# Patient Record
Sex: MALE | Race: WHITE | NOT HISPANIC OR LATINO | Employment: OTHER | URBAN - METROPOLITAN AREA
[De-identification: names, ages, dates, MRNs, and addresses within clinical notes are randomized per-mention and may not be internally consistent; named-entity substitution may affect disease eponyms.]

---

## 2020-08-20 ENCOUNTER — HOSPITAL ENCOUNTER (OUTPATIENT)
Dept: MRI IMAGING | Facility: HOSPITAL | Age: 35
Discharge: HOME | End: 2020-08-20
Attending: CHIROPRACTOR

## 2020-09-09 ENCOUNTER — OFFICE VISIT CONVERTED (OUTPATIENT)
Dept: NEUROSURGERY | Facility: CLINIC | Age: 35
End: 2020-09-09
Attending: PHYSICIAN ASSISTANT

## 2021-01-05 ENCOUNTER — OFFICE VISIT CONVERTED (OUTPATIENT)
Dept: NEUROLOGY | Facility: CLINIC | Age: 36
End: 2021-01-05
Attending: PSYCHIATRY & NEUROLOGY

## 2021-02-24 ENCOUNTER — CONVERSION ENCOUNTER (OUTPATIENT)
Dept: NEUROLOGY | Facility: CLINIC | Age: 36
End: 2021-02-24

## 2021-02-24 ENCOUNTER — OFFICE VISIT CONVERTED (OUTPATIENT)
Dept: NEUROSURGERY | Facility: CLINIC | Age: 36
End: 2021-02-24
Attending: PHYSICIAN ASSISTANT

## 2021-04-20 ENCOUNTER — OFFICE VISIT CONVERTED (OUTPATIENT)
Dept: NEUROSURGERY | Facility: CLINIC | Age: 36
End: 2021-04-20
Attending: NEUROLOGICAL SURGERY

## 2021-04-20 ENCOUNTER — CONVERSION ENCOUNTER (OUTPATIENT)
Dept: NEUROLOGY | Facility: CLINIC | Age: 36
End: 2021-04-20

## 2021-04-26 ENCOUNTER — HOSPITAL ENCOUNTER (OUTPATIENT)
Dept: PREADMISSION TESTING | Facility: HOSPITAL | Age: 36
Discharge: HOME | End: 2021-04-26
Attending: NEUROLOGICAL SURGERY

## 2021-04-27 LAB — SARS-COV-2 RNA SPEC QL NAA+PROBE: NOT DETECTED

## 2021-04-30 ENCOUNTER — HOSPITAL ENCOUNTER (OUTPATIENT)
Dept: PERIOP | Facility: HOSPITAL | Age: 36
Setting detail: HOSPITAL OUTPATIENT SURGERY
Discharge: HOME | End: 2021-04-30
Attending: NEUROLOGICAL SURGERY

## 2021-05-10 NOTE — H&P
History and Physical      Patient Name: Long Casillas   Patient ID: [id number]   Sex: Male   YOB: 1985    Referring Provider: Jono Calixto MD    Visit Date: January 5, 2021    Provider: Jimmy Turner MD   Location: [facility]   Location Address: [address] [place] [address]  [id number]   Location Phone: [phone]          Chief Complaint     New patient here for EMG of BUE       History Of Present Illness  Long Casillas is a 35 year old male who presents today to [facility] today referred from Jono Calixto MD.      35-year-old very pleasant young man evaluated for bilateral hand numbness and tingling for the last 5 years.  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  It involves the whole hand.  He states that his been wearing a wrist splint at night on both hands which helps his symptoms.  He has been wearing the splints for a long time which he brought himself.  He was never told in the past he had carpal tunnel syndrome.  He has had an MRI of the cervical spine which I reviewed which is unremarkable.  He is here today for nerve conduction study.       Past Medical History  Cervicalgia         Past Surgical History  *Denies any surgical procedures         Medication List  naproxen sodium 220 mg oral capsule         Allergy List  NO KNOWN DRUG ALLERGIES       Allergies Reconciled  Family Medical History  Family history of diabetes mellitus         Social History  Marijuana Use; Tobacco (Current every day)         Review of Systems  · Constitutional  o Denies  o : chills, excessive sweating, fatigue, fever, sycope/passing out, weight gain, weight loss  · Eyes  o Admits  o : changes in vision  o Denies  o : blurred vision, double vision  · HENT  o Denies  o : hearing loss, ringing in the ears,  ear aches, sore throat, nasal congestion, sinus pain, nose bleeds, seasonal allergies  · Cardiovascular  o Denies  o : blood clots, swollen legs, anemia, easy burising or bleeding, transfusions  · Respiratory  o Denies  o : shortness of breath, dry cough, productive cough, pneumonia, COPD  · Gastrointestinal  o Denies  o : dysphagia, reflux  · Genitourinary  o Denies  o : incontinence  · Neurologic  o Admits  o : headache, difficulty with sleep, numbness/tingling/paresthesia , weakness  o Denies  o : seizure, stroke, tremor, loss of balance, falls, dizziness/vertigo, difficulty with coordination, difficulty with dexterity  · Musculoskeletal  o Admits  o : neck stiffness/pain, joint pain, weakness, spasms, sciatica, pain radiating in arm, pain radiating in leg, low back pain  o Denies  o : swollen lymph nodes, muscle aches  · Endocrine  o Denies  o : diabetes, thyroid disorder  · Psychiatric  o Denies  o : anxiety, depression      Vitals  Date Time BP Position Site L\R Cuff Size HR RR TEMP (F) WT  HT  BMI kg/m2 BSA m2 O2 Sat FR L/min FiO2 HC       01/05/2021 08:41 /92 Sitting    91 - R  97.8 254lbs 4oz 6'   34.48 2.42             Physical Examination     He is alert, fluent, phasic, follows commands well.  There is no weakness of the upper extremities on individual muscle testing.  There is no weakness of intrinsic and muscles.  Phalen sign is negative.  Pressure to the wrist produces tingling and pain going up his arms which reproduces his symptoms.           Assessment  · Carpal tunnel syndrome     354.0/G56.00  He is to follow-up with neurosurgery for carpal tunnel surgery. I discussed with him regarding carpal tunnel syndrome and is to continue wearing his wrist splints until he has the carpal tunnel surgery.    I discussed with him that his symptoms are not secondary to cervical radiculopathy and therefore an EMG study was not performed.    Total time spent with patient according patient care was 15  minutes  · Numbness and tingling       Anesthesia of skin     782.0/R20.0  Paresthesia of skin     782.0/R20.2  The study is abnormal and shows electrophysiologic evidence for moderate bilateral carpal tunnel syndrome right greater than left.      Plan  · Orders  o Nerve conduction studies; 7-8 studies (98421) - 782.0/R20.0, 782.0/R20.2, 354.0/G56.00 - 01/05/2021  · Medications  o Medications have been Reconciled  o Transition of Care or Provider Policy  · Instructions  o Encouraged to follow-up with Primary Care Provider for preventative care.            Electronically Signed by: Jimmy Turner MD -Author on January 12, 2021 05:27:54 PM

## 2021-05-10 NOTE — H&P
History and Physical      Patient Name: Long Casillas   Patient ID: [id number]   Sex: Male   YOB: 1985    Referring Provider: Jono Calixto MD    Visit Date: September 9, 2020    Provider: Mary Jo Ryder PA-C   Location: [facility]   Location Address: [address] [place] [address]  [id number]   Location Phone: [phone]          Chief Complaint  · Neck pain and hand numbness      History Of Present Illness  The patient is a 34 year old male, who presents on referral from Jono Calixto MD, for a neurosurgical evaluation for a history of neck pain, left arm pain, and paresthesias.   The left arm pain is currently moderate and localized to the distal upper extremity distribution. The neck pain is localized to the posterior cervical region and has been present for 3 months. It is severe (7-8/10) and radiates into the right and left arm in a non-specific distribution. The pain is described as being constant and it is generally worse in the morning and worse at night. He is having difficulty with sleep due to the pain. The patient states the pain is aggravated by driving and raising arms above chest. He reports the pain is alleviated by rest and elbow compression. The paresthesias developed 5 years ago and are localized to the bilateral C6, C7, and non-specific dermatomal distribution.   The onset of the symptoms was not associated with any specific event or activity.   He denies bowel or bladder dysfunction. The patient has no prior history of neck or back surgery.   RECENT INTERVENTIONS:  He reports undergoing recent physical therapy. The physical therapy has not helped.   INFORMATION REVIEWED:  The following information was reviewed: radiology reports and radiographic images. The MRI of the cervical spine revealed degenerative disk disease and facet arthropathy. The degerative disc disease is present at multiple levels. The facet arthropathy is  present at C3-4 on the right and C3-4 on the left. There is foraminal narrowing at C3/4.      Pt notes that he is having tingling/numbness and pain in Tspine that radiates to left Tspine. Pt also complains of weakness in arms that has been worsening over the past several years.       Past Medical History  Cervicalgia         Past Surgical History  *Denies any surgical procedures         Medication List  naproxen sodium 220 mg oral capsule         Allergy List  NO KNOWN DRUG ALLERGIES         Family Medical History  Family history of diabetes mellitus         Social History  Tobacco (Current every day)         Review of Systems  · Constitutional  o Denies  o : chills, excessive sweating, fatigue, fever, sycope/passing out, weight gain, weight loss  · Eyes  o Admits  o : blurry vision  o Denies  o : changes in vision, double vision  · HENT  o Admits  o : ringing in the ears  o Denies  o : loss of hearing, ear aches, sore throat, nasal congestion, sinus pain, nose bleeds, seasonal allergies  · Cardiovascular  o Denies  o : blood clots, swollen legs, anemia, easy burising or bleeding, transfusions  · Respiratory  o Denies  o : shortness of breath, dry cough, productive cough, pneumonia, COPD  · Gastrointestinal  o Denies  o : difficulty swallowing, reflux  · Genitourinary  o Denies  o : incontinence  · Neurologic  o Denies  o : headache, seizure, stroke, tremor, loss of balance, falls, dizziness/vertigo, difficulty with sleep, numbness/tingling/paresthesia , difficulty with coordination, difficulty with dexterity, weakness  · Musculoskeletal  o Admits  o : neck stiffness/pain, muscle aches, joint pain, weakness, spasms, pain radiating in arm, low back pain  o Denies  o : swollen lymph nodes, sciatica, pain radiating in leg  · Endocrine  o Denies  o : diabetes, thyroid disorder  · Psychiatric  o Denies  o : anxiety, depression      Vitals  Date Time BP Position Site L\R Cuff Size HR RR TEMP (F) WT  HT  BMI kg/m2 BSA m2  O2 Sat        09/09/2020 01:38 PM        97.8 247lbs 7oz 6'   33.56 2.39           Physical Examination  · Constitutional  o Appearance  o : well-nourished, well developed, alert, in no acute distress  · Neck  o Inspection/Palpation  o : normal appearance, no masses, trachea midline. Tenderness of Cspine paraspinals and Tspine on right.   o Range of Motion  o : cervical range of motion within normal limits  · Respiratory  o Respiratory Effort  o : breathing unlabored  · Cardiovascular  o Peripheral Vascular System  o :   § Extremities  § : no edema or cyanosis  · Musculoskeletal  o Spine  o :   § Stability  § : no subluxations present  § Muscle Strength/Tone  § : paraspinal muscle strength within normal limits, paraspinal muscle tone within normal limits  o Right Upper Extremity  o :   § Inspection/Palpation  § : no tenderness to palpation  § Joint Stability  § : shoulder, elbow and wrist joint stability normal  § Range of Motion  § : range of motion normal, no joint crepitus or pain with motion present,shoulder negative  o Left Upper Extremity  o :   § Inspection/Palpation  § : no tenderness to palpation  § Joint Stability  § : shoulder, elbow and wrist joint stability normal  § Range of Motion  § : range of motion normal, no joint crepitus present, no pain with joint motion, shoulder negative  · Skin and Subcutaneous Tissue  o Neck  o : no lesions or areas of discoloration  · Neurologic  o Mental Status Examination  o :   § Orientation  § : alert and oriented to person, place, time and events  o Motor Examination  o :   § RUE Strength  § : weakness present   § RUE Motor Function  § : tone normal, muscle bulk normal  § LUE Strength  § : weakness present   § LUE Motor Function  § : tone normal, muscle bulk normal  o Reflexes  o :   § RUE  § : 2/4 in biceps/triceps/brachioradialis, Aguayo sign negative  § LUE  § : 2/4 in biceps/triceps/brachioradialis, Aguayo sign negative  o Sensation  o :   § Light Touch  § :  sensation intact to light touch in extremities  o Gait and Station  o :   § Gait Screening  § : normal gait, able to stand without difficulty  · Psychiatric  o Mood and Affect  o : mood normal, affect appropriate              Assessment  · Cervicalgia     723.1/M54.2  · Cervical radiculopathy     723.4/M54.12  · Paresthesia     782.0/R20.2  · Thoracic spine pain     724.1/M54.6  · Thoracic radiculitis     724.4/M54.14      Plan  · Orders  o PHYSICAL THERAPY CONSULTATION (PHYTH) - 723.1/M54.2, 723.4/M54.12, 782.0/R20.2, 724.1/M54.6 - 09/09/2020  o EMG/NCV of Upper Extremities Bilaterally (68552) - 723.4/M54.12, 782.0/R20.2 - 09/09/2020  · Medications  o Medications have been Reconciled  o Transition of Care or Provider Policy  · Instructions  o Encouraged to follow-up with Primary Care Provider for preventative care.  o The ROS and the PFSH were reviewed at today's visit.  o I have discussed the risks and benefits of surgery versus physical therapy and other conservative forms of treatment.  o Handouts provided: Cervical Exercises.  o Call or return if symptoms worsen or persist.  o Will send for PT and will order EMG of arms and will return afterwards. Will plan to order MRI of Tspine at next apt. Pt also complains of weakness in arms.   o Electronically Identified Patient Education Materials Provided Electronically  · Disposition  o Call or Return if symptoms worsen or persist.            Electronically Signed by: Mary Jo Ryder PA-C -Author on September 9, 2020 02:53:29 PM

## 2021-05-13 ENCOUNTER — CONVERSION ENCOUNTER (OUTPATIENT)
Dept: NEUROLOGY | Facility: CLINIC | Age: 36
End: 2021-05-13

## 2021-05-13 ENCOUNTER — OFFICE VISIT CONVERTED (OUTPATIENT)
Dept: NEUROLOGY | Facility: CLINIC | Age: 36
End: 2021-05-13
Attending: NURSE PRACTITIONER

## 2021-05-14 VITALS — BODY MASS INDEX: 35.5 KG/M2 | WEIGHT: 262.12 LBS | HEIGHT: 72 IN | TEMPERATURE: 97.3 F

## 2021-05-14 VITALS — TEMPERATURE: 97.2 F | BODY MASS INDEX: 35.29 KG/M2 | WEIGHT: 260.56 LBS | HEIGHT: 72 IN

## 2021-05-14 VITALS — TEMPERATURE: 97.8 F | BODY MASS INDEX: 33.52 KG/M2 | WEIGHT: 247.44 LBS | HEIGHT: 72 IN

## 2021-05-14 VITALS
BODY MASS INDEX: 34.44 KG/M2 | DIASTOLIC BLOOD PRESSURE: 92 MMHG | HEIGHT: 72 IN | SYSTOLIC BLOOD PRESSURE: 146 MMHG | HEART RATE: 91 BPM | TEMPERATURE: 97.8 F | WEIGHT: 254.25 LBS

## 2021-05-14 NOTE — PROGRESS NOTES
Progress Note      Patient Name: Long Casillas   Patient ID: [id number]   Sex: Male   YOB: 1985    Referring Provider: Jono Calixto MD    Visit Date: February 24, 2021    Provider: Lynn Oliveira PA-C   Location: [facility]   Location Address: [address] [place] [address]  [id number]   Location Phone: [phone]          Chief Complaint     Follow up with EMG.       History Of Present Illness  The patient is a 35 year old male who is in the office for followup appointment.      EMG/NCV showed moderate bilateral carpal tunnel, right greater than left. Having pain in the hands and pressure and this wakes him up at night and occurs during the day as well. Had non-operative pathology on MRI cervical spine. Throbbing sensation in his arms at times.  He is right hand dominant.  PT did not help and has been to chiropractic therapy.  Having pain in the right upper lumbar spine and lower thoracic pain. Pain in both legs and into the groin region and down to the knees.  Denies color changes in the hands.  He's a smoker.  Knots in the low back.       Past Medical History  Cervicalgia         Past Surgical History  *Denies any surgical procedures         Medication List  naproxen sodium 220 mg oral capsule         Allergy List  NO KNOWN DRUG ALLERGIES       Allergies Reconciled  Family Medical History  Family history of diabetes mellitus         Social History  Marijuana Use; Tobacco (Current every day)         Review of Systems  · Constitutional  o Denies  o : chills, excessive sweating, fatigue, fever, sycope/passing out, weight gain, weight loss  · Eyes  o Admits  o : blurry vision  o Denies  o : changes in vision, double vision  · HENT  o Denies  o : loss of hearing, ringing in the ears, ear aches, sore throat, nasal congestion, sinus pain, nose bleeds, seasonal allergies  · Cardiovascular  o Denies  o : blood clots, swollen legs, anemia, easy burising or  bleeding, transfusions  · Respiratory  o Denies  o : shortness of breath, dry cough, productive cough, pneumonia, COPD  · Gastrointestinal  o Denies  o : difficulty swallowing, reflux  · Genitourinary  o Denies  o : incontinence  · Neurologic  o Admits  o : difficulty with sleep, numbness/tingling/paresthesia   o Denies  o : headache, seizure, stroke, tremor, loss of balance, falls, dizziness/vertigo, difficulty with coordination, difficulty with dexterity, weakness  · Musculoskeletal  o Admits  o : spasms, pain radiating in arm, pain radiating in leg, low back pain  · Endocrine  o Denies  o : diabetes, thyroid disorder  · Psychiatric  o Denies  o : anxiety, depression  · All Others Negative      Vitals  Date Time BP Position Site L\R Cuff Size HR RR TEMP (F) WT  HT  BMI kg/m2 BSA m2 O2 Sat FR L/min FiO2 HC       02/24/2021 08:31 AM        97.2 260lbs 9oz 6'   35.34 2.45             Physical Examination  · Constitutional  o Appearance  o : well-nourished, well developed, alert, in no acute distress  · Respiratory  o Respiratory Effort  o : breathing unlabored  · Cardiovascular  o Peripheral Vascular System  o :   § Extremities  § : no cyanosis, clubbing or edema  · Neurologic  o Mental Status Examination  o :   § Orientation  § : grossly oriented to person, place and time  o Sensation  o :   § Light Touch  § : sensation intact to light touch in extremities  o Gait and Station  o :   § Gait Screening  § : gait within normal limits  · Psychiatric  o Mood and Affect  o : mood normal, affect appropriate     Motor 5/5 in upper and lower extremities.  Positive Tinel's on the right and negative on the left.     5/5.           Assessment  · Cervicalgia     723.1/M54.2  · Cervical radiculopathy     723.4/M54.12  · Carpal tunnel syndrome     354.0/G56.00  · Low back pain     724.2/M54.5  · Lumbar radiculopathy     724.4/M54.16      Plan  · Orders  o MRI lumbar spine wo contrast (73381) - 724.2/M54.5, 724.4/M54.16 -  02/24/2021   [facility]  · Medications  o Medications have been Reconciled  o Transition of Care or Provider Policy  · Instructions  o The ROS and the PFSH were reviewed at today's visit.  o Call or return to office if symptoms worsen or persist.   o I will order MRI lumbar spine and have him f/u with Dr. Castillo to discuss new imaging and discuss carpal tunnel release, right then left.             Electronically Signed by: Lynn Oliveira PA-C -Author on February 24, 2021 09:04:51 AM

## 2021-05-14 NOTE — PROGRESS NOTES
Progress Note      Patient Name: Long Casillas   Patient ID: [id number]   Sex: Male   YOB: 1985    Referring Provider: Jono Calixto MD    Visit Date: April 20, 2021    Provider: Raul Castillo MD   Location: [facility]   Location Address: [address] [place] [address]  [id number]   Location Phone: [phone]          Chief Complaint  · Surgical History and Physical     Here with complaints of right greater than left arm pain.       History Of Present Illness     He is a right-handed male with bilateral carpal tunnel syndrome (right greater than left on EMG/NCV).       Past Medical History  Cervicalgia         Past Surgical History  *Denies any surgical procedures         Allergy List  NO KNOWN DRUG ALLERGIES       Allergies Reconciled  Family Medical History  Family history of diabetes mellitus         Social History  Marijuana Use; Tobacco (Current every day)         Review of Systems  · Constitutional  o Admits  o : fatigue  o Denies  o : chills, excessive sweating, fever, sycope/passing out, weight gain, weight loss  · Eyes  o Denies  o : changes in vision, blurry vision, double vision  · HENT  o Denies  o : loss of hearing, ringing in the ears, ear aches, sore throat, nasal congestion, sinus pain, nose bleeds, seasonal allergies  · Cardiovascular  o Denies  o : blood clots, swollen legs, anemia, easy burising or bleeding, transfusions  · Respiratory  o Denies  o : shortness of breath, dry cough, productive cough, pneumonia, COPD  · Gastrointestinal  o Denies  o : difficulty swallowing, reflux  · Genitourinary  o Denies  o : incontinence  · Neurologic  o Admits  o : difficulty with sleep, numbness/tingling/paresthesia , weakness  o Denies  o : headache, seizure, stroke, tremor, loss of balance, falls, dizziness/vertigo, difficulty with coordination, difficulty with dexterity  · Musculoskeletal  o Admits  o : neck stiffness/pain, spasms, pain radiating in  arm  o Denies  o : swollen lymph nodes, muscle aches, joint pain, weakness, sciatica, pain radiating in leg, low back pain  · Endocrine  o Denies  o : diabetes, thyroid disorder  · Psychiatric  o Denies  o : anxiety, depression  · All Others Negative      Vitals  Date Time BP Position Site L\R Cuff Size HR RR TEMP (F) WT  HT  BMI kg/m2 BSA m2 O2 Sat FR L/min FiO2 HC       04/20/2021 09:04 AM        97.3 262lbs 2oz 6'   35.55 2.46             Physical Examination  · Constitutional  o Appearance  o : well-nourished, well developed, alert, in no acute distress  · Respiratory  o Respiratory Effort  o : breathing unlabored  · Cardiovascular  o Peripheral Vascular System  o :   § Extremities  § : no cyanosis, clubbing or edema  · Psychiatric  o Mood and Affect  o : mood normal, affect appropriate              Assessment  · Cervicalgia     723.1/M54.2  · Carpal tunnel syndrome     354.0/G56.00  · Preoperative examination     V72.84/Z01.818      Plan  · Medications  o Medications have been Reconciled  o Transition of Care or Provider Policy  · Instructions  o ****Surgical Orders****  o Outpatient  o RISK AND BENEFITS:  o Possible risks/complications, benefits and alternatives to surgical or invasive procedure have been explained to the patient and/or legal guardian.  o PREP: Per protocol;  o IV: Per Anesthesia;  o *******************************************  o PRE- OP MEDICATION ORDER:  o *******************************************  o Kefzol 2 grams IV on call to OR.  o ***************  o Date of Surgical Procedure:   o Please sign permit for:  o Carpal Tunnel Release-right wrist  o The above History and Physical must have been completed within 30 days of admission.            Electronically Signed by: Raul Castillo MD -Author on April 20, 2021 09:34:10 AM

## 2021-06-03 NOTE — PROCEDURES
Patient: TAMELA HERNANDEZ     Acct: [id number]     Report: #[id number]  MR #:  [id number]     DOS: 2021 1027     : 1985  DICTATING: Raul Castillo  ***Signed***  --------------------------------------------------------------------------------------------------------------------  Post Procedure/Operative Note      Date       21            Pre-Operative Diagnosis:      Right carpal tunnel syndrome            Post-Operative Diagnosis:      Same as pre-op diagnosis            Surgeon/Assistants      Surgeon      Jonathan Burnett            Anesthesia      MAC            Procedure Performed/Technique      Right carpal tunnel release            Specimen/Tissue Removed:      None            Findings:            Compression of the median nerve with erythema of the median nerve            Complications:      No            Estimated Blood Loss:      10 mL            Procedure:      Indications for procedure: 35-year-old male with carpal tunnel syndrome on the     right.  He failed conservative interventions and opted for decompression.            Description of procedure: After informed consent was obtained, the patient was     brought to the operating room.  After the administration of IV sedation, the     right hand and arm were prepped and draped in the typical fashion.  Timeout was     performed.  Local anesthetic was injected along the distal wrist crease as well     as the palmar surface of the right hand.  An incision was made in line with the     middle finger starting just distal to the distal wrist crease approximately inch    and a half in length.  This was taken down through the palmar fat.  The     transverse carpal ligament was identified and divided until the median nerve was    encountered.  The Penfield 4 was used to protect the nerve as the transverse     carpal ligament was divided distally until the palmar fat pad was reached.  The     Penfield 4 was then used to  create a plane between the median nerve and the     transverse carpal ligament proximally the tenotomy scissors were then used to     divide the transverse carpal ligament until a [employer] elevator passed very freely     across the proximal course of the median nerve.  The wound was then irrigated     with normal saline.  Hemostasis was assured using a bipolar electrocautery.  The    subcutaneous tissue was reapproximated using interrupted 2-0 Vicryl followed by     a vertical mattress 3-0 nylon to reapproximate the skin edges.  The wound was     dressed with bacitracin Telfa, 4 x 4's, Kerlix and an Ace bandage.  There were     no apparent intraoperative complications.  All sponge and needle counts were     correct.            Raul Castillo                   Apr 30, 2021 10:27      Electronically signed by Raul Castillo  04/30/2021 10:27     Disclaimer: Converted hospital document may not contain table formatting or lab diagrams. Please see [employer] [facility] for authenticated document.

## 2021-06-05 NOTE — PROGRESS NOTES
Progress Note      Patient Name: Long Casillas   Patient ID: [id number]   Sex: Male   YOB: 1985    Referring Provider: Jono Calixto MD    Visit Date: May 13, 2021    Provider: NATALI Oakes   Location: [facility]   Location Address: [address] [place] [address]  [id number]   Location Phone: [phone]          Chief Complaint  · Post-Op     Patient is 2 weeks post right carpal tunnel release       History Of Present Illness  The patient is a 35 year old male who is in the office for followup appointment.      Pt doing well. Incision healing and numbness and tingling in the right hand has significantly improved.       Past Medical History  Carpal tunnel syndrome; Cervical radiculopathy; Cervicalgia; Low back pain; Lumbar radiculopathy         Past Surgical History  Carpal Tunnel Release         Allergy List  NO KNOWN DRUG ALLERGIES       Allergies Reconciled  Family Medical History  Family history of diabetes mellitus         Social History  Marijuana Use; Tobacco (Current every day)         Review of Systems  · All Others Negative      Vitals  Date Time BP Position Site L\R Cuff Size HR RR TEMP (F) WT  HT  BMI kg/m2 BSA m2 O2 Sat FR L/min FiO2        05/13/2021 03:13 /64 Sitting    78 - R 82 97.4 268lbs 2oz 6'   36.36 2.49 100 %            Physical Examination  · Constitutional  o Appearance  o : well-nourished, well developed, alert, in no acute distress  · Respiratory  o Respiratory Effort  o : breathing unlabored  · Cardiovascular  o Peripheral Vascular System  o :   § Extremities  § : no cyanosis, clubbing or edema  · Neurologic  o Mental Status Examination  o :   § Orientation  § : grossly oriented to person, place and time  o Sensation  o :   § Light Touch  § : sensation intact to light touch in extremities  o Gait and Station  o :   § Gait Screening  § : gait within normal limits  · Psychiatric  o Mood and Affect  o : mood  normal, affect appropriate     Right hand incision healing, well approximated. C/D/I with no erythema or edema    Hand prepped with alcohol. Sutures removed, incision open to air.           Assessment  · Carpal tunnel syndrome on both sides     354.0/G56.03       Pt doing well. Will call when ready for left carpal tunnel release.       Plan  · Medications  o Medications have been Reconciled  o Transition of Care or Provider Policy  · Instructions  o Encouraged to follow-up with Primary Care Provider for preventative care.  o The ROS and the PFSH were reviewed at today's visit.  o Call or return to office if symptoms worsen or persist.   o Ok to return to work.  o Incision open to air. Apply Vitamin E as needed to scar.            Electronically Signed by: NATALI Oakes -Author on May 13, 2021 04:05:26 PM

## 2021-07-15 VITALS
SYSTOLIC BLOOD PRESSURE: 138 MMHG | TEMPERATURE: 97.4 F | HEIGHT: 72 IN | WEIGHT: 268.12 LBS | DIASTOLIC BLOOD PRESSURE: 64 MMHG | OXYGEN SATURATION: 100 % | HEART RATE: 78 BPM | BODY MASS INDEX: 36.32 KG/M2

## 2021-08-11 ENCOUNTER — APPOINTMENT (OUTPATIENT)
Dept: ULTRASOUND IMAGING | Facility: HOSPITAL | Age: 36
End: 2021-08-11

## 2021-08-11 ENCOUNTER — HOSPITAL ENCOUNTER (EMERGENCY)
Facility: HOSPITAL | Age: 36
Discharge: HOME | End: 2021-08-12
Attending: EMERGENCY MEDICINE | Admitting: EMERGENCY MEDICINE

## 2021-08-11 DIAGNOSIS — S30.22XA SCROTAL HEMATOMA: Primary | ICD-10-CM

## 2021-08-11 PROCEDURE — 96374 THER/PROPH/DIAG INJ IV PUSH: CPT

## 2021-08-11 PROCEDURE — 76870 US EXAM SCROTUM: CPT

## 2021-08-11 PROCEDURE — 99283 EMERGENCY DEPT VISIT LOW MDM: CPT

## 2021-08-12 ENCOUNTER — APPOINTMENT (OUTPATIENT)
Dept: CT IMAGING | Facility: HOSPITAL | Age: 36
End: 2021-08-12

## 2021-08-12 VITALS
HEART RATE: 81 BPM | BODY MASS INDEX: 35.18 KG/M2 | TEMPERATURE: 99.3 F | OXYGEN SATURATION: 96 % | SYSTOLIC BLOOD PRESSURE: 126 MMHG | WEIGHT: 259.7 LBS | RESPIRATION RATE: 20 BRPM | DIASTOLIC BLOOD PRESSURE: 75 MMHG | HEIGHT: 72 IN

## 2021-08-12 LAB
ALBUMIN SERPL-MCNC: 4.2 G/DL (ref 3.5–5.2)
ALBUMIN/GLOB SERPL: 1.3 G/DL
ALP SERPL-CCNC: 98 U/L (ref 39–117)
ALT SERPL W P-5'-P-CCNC: 20 U/L (ref 1–41)
ANION GAP SERPL CALCULATED.3IONS-SCNC: 8.5 MMOL/L (ref 5–15)
AST SERPL-CCNC: 16 U/L (ref 1–40)
BASOPHILS # BLD AUTO: 0.08 10*3/MM3 (ref 0–0.2)
BASOPHILS NFR BLD AUTO: 0.4 % (ref 0–1.5)
BILIRUB SERPL-MCNC: 0.6 MG/DL (ref 0–1.2)
BILIRUB UR QL STRIP: NEGATIVE
BUN SERPL-MCNC: 19 MG/DL (ref 6–20)
BUN/CREAT SERPL: 23.8 (ref 7–25)
CALCIUM SPEC-SCNC: 9.1 MG/DL (ref 8.6–10.5)
CHLORIDE SERPL-SCNC: 102 MMOL/L (ref 98–107)
CLARITY UR: CLEAR
CO2 SERPL-SCNC: 24.5 MMOL/L (ref 22–29)
COLOR UR: YELLOW
CREAT SERPL-MCNC: 0.8 MG/DL (ref 0.76–1.27)
D-LACTATE SERPL-SCNC: 0.6 MMOL/L (ref 0.5–2)
DEPRECATED RDW RBC AUTO: 46.2 FL (ref 37–54)
EOSINOPHIL # BLD AUTO: 0.18 10*3/MM3 (ref 0–0.4)
EOSINOPHIL NFR BLD AUTO: 0.9 % (ref 0.3–6.2)
ERYTHROCYTE [DISTWIDTH] IN BLOOD BY AUTOMATED COUNT: 13.6 % (ref 12.3–15.4)
GFR SERPL CREATININE-BSD FRML MDRD: 111 ML/MIN/1.73
GLOBULIN UR ELPH-MCNC: 3.2 GM/DL
GLUCOSE SERPL-MCNC: 98 MG/DL (ref 65–99)
GLUCOSE UR STRIP-MCNC: NEGATIVE MG/DL
HCT VFR BLD AUTO: 45.7 % (ref 37.5–51)
HGB BLD-MCNC: 15.9 G/DL (ref 13–17.7)
HGB UR QL STRIP.AUTO: NEGATIVE
IMM GRANULOCYTES # BLD AUTO: 0.09 10*3/MM3 (ref 0–0.05)
IMM GRANULOCYTES NFR BLD AUTO: 0.5 % (ref 0–0.5)
KETONES UR QL STRIP: NEGATIVE
LEUKOCYTE ESTERASE UR QL STRIP.AUTO: NEGATIVE
LYMPHOCYTES # BLD AUTO: 3.89 10*3/MM3 (ref 0.7–3.1)
LYMPHOCYTES NFR BLD AUTO: 19.5 % (ref 19.6–45.3)
MCH RBC QN AUTO: 31.8 PG (ref 26.6–33)
MCHC RBC AUTO-ENTMCNC: 34.8 G/DL (ref 31.5–35.7)
MCV RBC AUTO: 91.4 FL (ref 79–97)
MONOCYTES # BLD AUTO: 1.05 10*3/MM3 (ref 0.1–0.9)
MONOCYTES NFR BLD AUTO: 5.3 % (ref 5–12)
NEUTROPHILS NFR BLD AUTO: 14.61 10*3/MM3 (ref 1.7–7)
NEUTROPHILS NFR BLD AUTO: 73.4 % (ref 42.7–76)
NITRITE UR QL STRIP: NEGATIVE
NRBC BLD AUTO-RTO: 0 /100 WBC (ref 0–0.2)
PH UR STRIP.AUTO: 5.5 [PH] (ref 5–8)
PLATELET # BLD AUTO: 227 10*3/MM3 (ref 140–450)
PMV BLD AUTO: 10.5 FL (ref 6–12)
POTASSIUM SERPL-SCNC: 3.8 MMOL/L (ref 3.5–5.2)
PROT SERPL-MCNC: 7.4 G/DL (ref 6–8.5)
PROT UR QL STRIP: NEGATIVE
RBC # BLD AUTO: 5 10*6/MM3 (ref 4.14–5.8)
SODIUM SERPL-SCNC: 135 MMOL/L (ref 136–145)
SP GR UR STRIP: >=1.03 (ref 1–1.03)
UROBILINOGEN UR QL STRIP: NORMAL
WBC # BLD AUTO: 19.9 10*3/MM3 (ref 3.4–10.8)

## 2021-08-12 PROCEDURE — 85025 COMPLETE CBC W/AUTO DIFF WBC: CPT | Performed by: EMERGENCY MEDICINE

## 2021-08-12 PROCEDURE — 99284 EMERGENCY DEPT VISIT MOD MDM: CPT | Performed by: UROLOGY

## 2021-08-12 PROCEDURE — 81003 URINALYSIS AUTO W/O SCOPE: CPT | Performed by: EMERGENCY MEDICINE

## 2021-08-12 PROCEDURE — 0 IOPAMIDOL PER 1 ML: Performed by: EMERGENCY MEDICINE

## 2021-08-12 PROCEDURE — 96374 THER/PROPH/DIAG INJ IV PUSH: CPT

## 2021-08-12 PROCEDURE — 25010000002 KETOROLAC TROMETHAMINE PER 15 MG: Performed by: EMERGENCY MEDICINE

## 2021-08-12 PROCEDURE — 74177 CT ABD & PELVIS W/CONTRAST: CPT

## 2021-08-12 PROCEDURE — 83605 ASSAY OF LACTIC ACID: CPT | Performed by: EMERGENCY MEDICINE

## 2021-08-12 PROCEDURE — 87040 BLOOD CULTURE FOR BACTERIA: CPT | Performed by: EMERGENCY MEDICINE

## 2021-08-12 PROCEDURE — 80053 COMPREHEN METABOLIC PANEL: CPT | Performed by: EMERGENCY MEDICINE

## 2021-08-12 RX ORDER — DOXYCYCLINE HYCLATE 100 MG/1
100 TABLET, DELAYED RELEASE ORAL 2 TIMES DAILY
Qty: 20 TABLET | Refills: 0 | Status: SHIPPED | OUTPATIENT
Start: 2021-08-12 | End: 2021-08-22

## 2021-08-12 RX ORDER — HYDROCODONE BITARTRATE AND ACETAMINOPHEN 5; 325 MG/1; MG/1
1 TABLET ORAL EVERY 6 HOURS PRN
Qty: 12 TABLET | Refills: 0 | Status: SHIPPED | OUTPATIENT
Start: 2021-08-12 | End: 2021-10-21

## 2021-08-12 RX ORDER — KETOROLAC TROMETHAMINE 30 MG/ML
30 INJECTION, SOLUTION INTRAMUSCULAR; INTRAVENOUS ONCE
Status: COMPLETED | OUTPATIENT
Start: 2021-08-12 | End: 2021-08-12

## 2021-08-12 RX ADMIN — KETOROLAC TROMETHAMINE 30 MG: 30 INJECTION, SOLUTION INTRAMUSCULAR; INTRAVENOUS at 06:19

## 2021-08-12 RX ADMIN — IOPAMIDOL 100 ML: 755 INJECTION, SOLUTION INTRAVENOUS at 01:55

## 2021-08-12 NOTE — CONSULTS
[facility]   UROLOGY Consult Note    Patient Name: Long Casillas  : 1985  MRN: [id number]  Primary Care Physician:  Provider, No Known  Referring Physician: No ref. provider found  Date of admission: 2021    Subjective   Subjective     Chief Complaint: Scrotal scrotal pain    HPI:      35 y.o. year old male that presents to the emergency department with TESTICLE PAIN found to have left scrotal hematoma      2021 CT abdomen/pelvis with - inferior left scrotum possible small hematoma no drainable fluid collection.  No subcutaneous emphysema.  No foreign body.  No scrotal mass seen.  Possible inguinal hernias but no bowel containing inguinal hernias and skin into the scrotal sac.      2021 scrotal ultrasound-no testicular torsion, no epididymal orchitis, 3 cm extratesticular mass involving the left inferior lateral scrotum.    Patient earlier today had a popping sensation and severe pain that made it hard for him to walk or do anything.  Came to the emergency room.  Pain was a 10/10.  Pain was in his scrotum radiated to the groin.  Pain medicine made the pain better nothing made the pain worse.  Pain was stabbing in nature.     Pt denies any issues with bowel movements.    Review of Systems     10 systems reviewed and are negative other than what is listed in the HPI    Personal History     Past Medical History:   Diagnosis Date   • Carpal tunnel syndrome 2021   • Cervical radiculopathy 2021   • Cervicalgia    • Low back pain 2021   • Lumbar radiculopathy 2021       Past Surgical History:   Procedure Laterality Date   • CARPAL TUNNEL RELEASE Right 2021    [facility]       Family History: family history includes Diabetes in an other family member. Otherwise pertinent FHx was reviewed and not pertinent to current issue.    Social History:  reports that he has been smoking. He does not have any smokeless tobacco history on file.    Home Medications:        Allergies:  No Known Allergies    Objective    Objective     Vitals:   Temp:  [99.3 °F (37.4 °C)] 99.3 °F (37.4 °C)  Heart Rate:  [] 73  Resp:  [20] 20  BP: (114-122)/(64-85) 120/85    Physical Exam:   Constitutional: Awake, alert        Respiratory: Clear to auscultation bilaterally, nonlabored respirations    Cardiovascular: RRR, no murmurs, rubs, or gallops, palpable pedal pulses bilaterally   Gastrointestinal: Positive bowel sounds, soft, nontender, nondistended   Musculoskeletal: No bilateral ankle edema, no clubbing or cyanosis to extremities   Psychiatric: Appropriate affect, cooperative   Neurologic: Oriented x 3, strength symmetric in all extremities   Skin: No rashes     Normal circumcised phallus, bilateral descended testicles, right testicle nontender.  Left testicle and scrotum very tender to palpation.  No signs of crepitus or erythema.    Result Review    Result Review:  I have personally reviewed the results from the time of this admission to 8/12/2021 05:30 EDT and agree with these findings:  []  Laboratory  []  Microbiology  []  Radiology  []  EKG/Telemetry   []  Cardiology/Vascular   []  Pathology  []  Old records  []  Other:      Assessment/Plan   Assessment / Plan     Brief Patient Summary:  Long Casillas is a 35 y.o. male     Scrotal hematoma, spontaneous  Left scrotal pain    Active Hospital Problems:  There are no active hospital problems to display for this patient.      Plan:     ER records and imaging and read reviewed and summarized in the chart.    Patient discussed with emergency room physician    Discussed with the patient he does have what looks to be a spontaneous hematoma in the left scrotum.  We discussed will be very painful for several weeks, but should slowly resolve.  No signs of testicular mass or testicular abnormality.  Patient given reassurance no signs of testicular cancer.  That being said I do want to repeat a scrotal ultrasound in about 4 months.  I will make  this follow-up with my office    Patient to go home on as needed narcotic pain medicine in doxycycline 100 mg p.o. twice daily x10 days.    I will make his follow-up in my office    Electronically signed by Gómez Jernigan MD, 08/12/21, 5:30 AM EDT.

## 2021-08-12 NOTE — ED PROVIDER NOTES
"Time: 12:00 AM EDT  Arrived by: private car  Chief Complaint:   Chief Complaint   Patient presents with   • Testicle Pain     left side     History provided by: pt  History is limited by: N/A     History of Present Illness:  Patient is a 35 y.o. year old male that presents to the emergency department with TESTICLE PAIN.    Pt states he was at work when he felt something pop behind his scrotum and thought nothing of it. An hour later, his scrotum was tender and swollen. Pt states when he got home he could barely get out of his car due to pain. Pt denies any known injury to his testicle. Pt complains of LLQ abdomen pain and left testicle pain. Pt has been urinating normal.  Pt denies any other pain or symptoms. Pt denies any issues with bowel movements.      History provided by:  Patient   used: No    Testicle Pain  Location:  Left testicle  Quality:  N/A  Severity:  Moderate  Onset quality:  Sudden  Duration:  5 hours  Timing:  Constant  Progression:  Worsening  Chronicity:  New  Context:  Unknown \"pop\"  Relieved by:  None tried  Worsened by:  Nothing  Ineffective treatments:  None tried  Associated symptoms: abdominal pain (LLQ)    Associated symptoms: no chest pain, no congestion, no diarrhea, no ear pain, no fever, no headaches, no nausea, no rash, no rhinorrhea, no shortness of breath, no sore throat and no vomiting        Similar Symptoms Previously: none  Recently seen: not recently seen in this ED    Patient Care Team  Primary Care Provider: Provider, No Known    Past Medical History:     No Known Allergies  Past Medical History:   Diagnosis Date   • Carpal tunnel syndrome 04/20/2021   • Cervical radiculopathy 04/20/2021   • Cervicalgia    • Low back pain 04/20/2021   • Lumbar radiculopathy 04/20/2021     Past Surgical History:   Procedure Laterality Date   • CARPAL TUNNEL RELEASE Right 04/30/2021    [facility]     Family History   Problem Relation Age of Onset   • Diabetes Other         " "UNSPECIFIED       Home Medications:  Prior to Admission medications    Not on File        Social History:   Social History     Tobacco Use   • Smoking status: Current Every Day Smoker   Substance Use Topics   • Alcohol use: Not on file   • Drug use: Not on file     Recent travel: not applicable     Review of Systems:  Review of Systems   Constitutional: Negative for chills and fever.   HENT: Negative for congestion, ear pain, rhinorrhea, sore throat and tinnitus.    Eyes: Negative for photophobia and pain.   Respiratory: Negative for choking and shortness of breath.    Cardiovascular: Negative for chest pain, palpitations and leg swelling.   Gastrointestinal: Positive for abdominal pain (LLQ). Negative for abdominal distention, diarrhea, nausea and vomiting.   Endocrine: Negative for polydipsia.   Genitourinary: Positive for testicular pain (left ). Negative for difficulty urinating, dysuria and hematuria.   Musculoskeletal: Negative for back pain, gait problem and neck pain.   Skin: Negative for rash.   Neurological: Negative for dizziness, seizures, speech difficulty, weakness, light-headedness, numbness and headaches.   Hematological: Negative for adenopathy.   Psychiatric/Behavioral: Negative for agitation, confusion, self-injury and suicidal ideas.        Physical Exam:  /75   Pulse 81   Temp 99.3 °F (37.4 °C) (Oral)   Resp 20   Ht 182.9 cm (72\")   Wt 118 kg (259 lb 11.2 oz)   SpO2 96%   BMI 35.22 kg/m²     Physical Exam  Vitals and nursing note reviewed.   Constitutional:       Appearance: Normal appearance. He is well-developed.   HENT:      Head: Normocephalic and atraumatic.      Right Ear: External ear normal.      Left Ear: External ear normal.      Nose: Nose normal.      Mouth/Throat:      Mouth: Mucous membranes are moist.      Pharynx: Oropharynx is clear.   Eyes:      General: Lids are normal.      Extraocular Movements: Extraocular movements intact.      Conjunctiva/sclera: Conjunctivae " normal.      Pupils: Pupils are equal, round, and reactive to light.   Cardiovascular:      Rate and Rhythm: Normal rate and regular rhythm.      Pulses: Normal pulses.      Heart sounds: Normal heart sounds. No murmur heard.     Pulmonary:      Effort: Pulmonary effort is normal.      Breath sounds: Normal breath sounds. No stridor. No wheezing.   Abdominal:      Palpations: Abdomen is soft.      Tenderness: There is no abdominal tenderness.   Genitourinary:     Testes:         Left: Tenderness present.      Comments: There is erythema and tenderness of the left testicle.   Musculoskeletal:         General: Normal range of motion.      Cervical back: Full passive range of motion without pain, normal range of motion and neck supple.      Right lower leg: No edema.      Left lower leg: No edema.   Skin:     General: Skin is warm and dry.      Capillary Refill: Capillary refill takes less than 2 seconds.   Neurological:      General: No focal deficit present.      Mental Status: He is alert and oriented to person, place, and time. Mental status is at baseline.      Cranial Nerves: Cranial nerves are intact.      Sensory: Sensation is intact.      Motor: Motor function is intact.      Coordination: Coordination is intact.   Psychiatric:         Attention and Perception: Attention and perception normal.         Mood and Affect: Mood and affect normal.         Speech: Speech normal.         Behavior: Behavior normal. Behavior is cooperative.         Thought Content: Thought content normal.         Cognition and Memory: Cognition and memory normal.                Medications in the Emergency Department:  Medications   iopamidol (ISOVUE-370) 76 % injection 100 mL (100 mL Intravenous Given 8/12/21 0155)   ketorolac (TORADOL) injection 30 mg (30 mg Intravenous Given 8/12/21 0619)        Labs  Lab Results (last 24 hours)     Procedure Component Value Units Date/Time    Urinalysis With Microscopic If Indicated (No Culture) -  Urine, Clean Catch [[id number]]  (Normal) Collected: 08/12/21 0111    Specimen: Urine, Clean Catch Updated: 08/12/21 0134     Color, UA Yellow     Appearance, UA Clear     pH, UA 5.5     Specific Gravity, UA >=1.030     Glucose, UA Negative     Ketones, UA Negative     Bilirubin, UA Negative     Blood, UA Negative     Protein, UA Negative     Leuk Esterase, UA Negative     Nitrite, UA Negative     Urobilinogen, UA 1.0 E.U./dL    Narrative:      Urine microscopic not indicated.    CBC & Differential [[id number]]  (Abnormal) Collected: 08/12/21 0137    Specimen: Blood Updated: 08/12/21 0144    Narrative:      The following orders were created for panel order CBC & Differential.  Procedure                               Abnormality         Status                     ---------                               -----------         ------                     CBC Auto Differential[[id number]]        Abnormal            Final result                 Please view results for these tests on the individual orders.    Comprehensive Metabolic Panel [[id number]]  (Abnormal) Collected: 08/12/21 0137    Specimen: Blood Updated: 08/12/21 0209     Glucose 98 mg/dL      BUN 19 mg/dL      Creatinine 0.80 mg/dL      Sodium 135 mmol/L      Potassium 3.8 mmol/L      Chloride 102 mmol/L      CO2 24.5 mmol/L      Calcium 9.1 mg/dL      Total Protein 7.4 g/dL      Albumin 4.20 g/dL      ALT (SGPT) 20 U/L      AST (SGOT) 16 U/L      Alkaline Phosphatase 98 U/L      Total Bilirubin 0.6 mg/dL      eGFR Non African Amer 111 mL/min/1.73      Globulin 3.2 gm/dL      A/G Ratio 1.3 g/dL      BUN/Creatinine Ratio 23.8     Anion Gap 8.5 mmol/L     Narrative:      GFR Normal >60  Chronic Kidney Disease <60  Kidney Failure <15      Blood Culture - Blood, Arm, Left [[id number]] Collected: 08/12/21 0137    Specimen: Blood from Arm, Left Updated: 08/12/21 0141    Lactic Acid, Plasma [[id number]]  (Normal) Collected: 08/12/21 0137    Specimen: Blood Updated: 08/12/21  0209     Lactate 0.6 mmol/L     CBC Auto Differential [[id number]]  (Abnormal) Collected: 08/12/21 0137    Specimen: Blood Updated: 08/12/21 0144     WBC 19.90 10*3/mm3      RBC 5.00 10*6/mm3      Hemoglobin 15.9 g/dL      Hematocrit 45.7 %      MCV 91.4 fL      MCH 31.8 pg      MCHC 34.8 g/dL      RDW 13.6 %      RDW-SD 46.2 fl      MPV 10.5 fL      Platelets 227 10*3/mm3      Neutrophil % 73.4 %      Lymphocyte % 19.5 %      Monocyte % 5.3 %      Eosinophil % 0.9 %      Basophil % 0.4 %      Immature Grans % 0.5 %      Neutrophils, Absolute 14.61 10*3/mm3      Lymphocytes, Absolute 3.89 10*3/mm3      Monocytes, Absolute 1.05 10*3/mm3      Eosinophils, Absolute 0.18 10*3/mm3      Basophils, Absolute 0.08 10*3/mm3      Immature Grans, Absolute 0.09 10*3/mm3      nRBC 0.0 /100 WBC            Imaging:  US Scrotum & Testicles    Result Date: 8/12/2021  PROCEDURE: US SCROTUM AND TESTICLES  COMPARISON: None.  INDICATIONS: LEFT-SIDED TESTICULAR/SCROTAL PAIN.  TECHNIQUE:   The scrotum and testicles (testes) were evaluated by routine duplex ultrasound examination, including two-dimensional grayscale images as well as color/spectral duplex Doppler analysis.   FINDINGS:   TESTES:   Normal.  No visible mass.  Normal echotexture, size, and flow are noted.  The right testis measures 4.4 x 2.7 x 3.5 cm. The left testis measures 4.7 x 2.3 x 3.5 cm.  EPIDIDYMIDES:   There is a suspected benign cyst involving the right epididymal body, which measures about 8 mm in greatest diameter, as seen on image 17/42 of series 1.  In the differential diagnosis would be a benign tunica albuginea cyst.  This finding is of doubtful clinical significance.  Otherwise, no acute findings are seen involving the epididymides.  No acute epididymitis.  The AP (anteroposterior) dimension of the right epididymal head is 0.8 cm.  The AP dimension of the left epididymal head is 0.9 cm.  OTHER:   There is a heterogeneous mixed echogenicity mass identified  "lateral and inferior to the left testis, which measures approximately 2 x 2.8 x 2.8 cm in AP, transverse, and craniocaudal extent, respectively, as seen on image 37/42 and image 41/42 of series 1.  It may contain a few color Doppler echoes.  It is nonspecific.  It may be related to an age-indeterminate hematoma or left-sided inguinal hernia.  An extratesticular fibrous pseudotumor is possible.  An epidermal inclusion cyst would be in the differential diagnosis.  It is located about 1 cm deep to the skin surface.  There may be mild thickening of the scrotal wall in this region.  Consider Urology consultation and imaging follow-up to ensure a benign progression and to exclude a malignant lesion, such as a liposarcoma or a leiomyosarcoma or a malignant fibrous histiocytoma.  These possibilities are thought to be less likely.  Again, there is a suspected left inguinal hernia.  It probably does not contain bowel.  A right-sided varicocele is seen.  No definite left-sided varicocele is appreciated.  There is a small left-sided hydrocele.  No definite right-sided hydrocele is seen.  CONCLUSION:   1. No testicular torsion is identified.  2. No acute epididymo-orchitis is suggested.  3. There is a nearly 3 cm heterogeneous extratesticular mass involving the left inferolateral scrotum, which may be within the scrotal wall.  Differential considerations are many and are as detailed above under the \"other\" subheading.  Consider Urology consultation and imaging follow-up to ensure a benign progression.  4. Please see above comments for further detail.     PARAMJIT MORENO JR, MD       Electronically Signed and Approved By: PARAMJIT MORENO JR, MD on 8/12/2021 at 0:48             CT Abdomen Pelvis With Contrast    Result Date: 8/12/2021  PROCEDURE: CT ABDOMEN PELVIS W CONTRAST  COMPARISON: [facility], US, US SCROTUM AND TESTICLES, 8/11/2021, 23:48.  INDICATIONS: LEFT LOWER QUADRANT ABDOMINAL PAIN; LEFT SCROTAL/TESTICULAR " PAIN TONIGHT.  NO HISTORY OF RECENT SCROTAL TRAUMA IS PROVIDED.  TECHNIQUE: After obtaining the patient's consent, 736 CT images were created with non-ionic intravenous contrast material.   PROTOCOL:   Standard imaging protocol performed    RADIATION:   DLP: 1,269.7 mGy*cm   Automated exposure control was utilized to minimize radiation dose. CONTRAST: 100 mL Isovue 370 I.V.  FINDINGS: There is subtle asymmetric attenuation of the fat within the posterior, inferior left scrotum with a CT number of about 50 Hounsfield units.  In the differential diagnosis would be a small hematoma.  No sizable (drainable) fluid collection is seen in this region.  No subcutaneous emphysema is identified.  No retained foreign body is appreciated.  No calcification is seen.  Tiny fat-containing bilateral inguinal hernias are possible.  They do not contain bowel.  No definite mass is seen involving the left scrotum by CT examination.  Again, consider Urology consultation and scrotal ultrasound examination follow-up to ensure a benign progression.  The colon has a diffusely underdistended appearance.  There are scattered colonic diverticula.  Probably no acute diverticulitis or colitis.  No acute appendicitis.  No acute diverticulitis.  No mechanical bowel obstruction.  No pneumoperitoneum or pneumatosis.  No renal/ureteral stones or hydronephrosis or obstructive uropathy.  No acute pyelonephritis.  There is a suspected benign 1.2 cm anterior lower pole right renal cyst with a CT number of less than 10 Hounsfield units, as seen on image 56 of series 301, image 97 of series 202, and image 79 of series 203.  No acute pancreatitis or cholecystitis.  No gallstones.  There is diffuse hepatic steatosis with hepatomegaly.  No splenomegaly.  No acute infiltrate is suspected in the lung bases.  There is (are) probably subsegmental atelectasis and/or fibrosis in the posterior, medial, inferior lower lobe of the left lung, such as seen on image 146  of series 203, image 152 of series 202, image 19 of series 204, and adjacent images.  Mild to moderate degenerative changes are seen throughout the imaged spine, especially the lower thoracic spine.  There may be diffuse idiopathic skeletal hyperostosis (DISH).  Mild degenerative changes involve the hip joints and the bilateral sacroiliac joints.  No acute fracture.  No aggressive osseous lesion is suggested.   CONCLUSION: Subtle asymmetric density is seen within the inferior and posterior aspects of the left scrotum, which may represent a small hematoma, possibly acute to subacute in nature.  No scrotal mass is suggested.  No bowel-containing inguinal hernia extending into the scrotal sac is identified.  There may be very tiny fat-containing inguinal hernias bilaterally.  Otherwise, no acute findings are suspected.     PARAMJIT MORENO JR, MD       Electronically Signed and Approved By: PARAMJIT MORENO JR, MD on 8/12/2021 at 2:43               Procedures:  Procedures    Progress                            Medical Decision Making:  MDM  Number of Diagnoses or Management Options  Scrotal hematoma  Diagnosis management comments: Patient is afebrile nontoxic-appearing.  Vital signs stable.  Patient presented to the emergency department with acute onset left testicular pain.  On exam he has tenderness over the left scrotum.  He denies any trauma. He has mild erythema around the scrotum.  Ultrasound showed mass.  Discussed this with urologist recommend getting CT scan for further evaluation.  Labs were also obtained and showed an elevated white count of 19.  CT was subsequently obtained that showed a small hematoma no mass.  Again discussed this with urologist Dr. Jernigan who evaluated patient.  He recommends antibiotics and pain medication.  Recommend close follow-up with his primary physician.  He will also follow up with Dr. Jernigan in his office.  Discussed return precautions, discharge instructions and answered all  his questions.       Amount and/or Complexity of Data Reviewed  Clinical lab tests: ordered and reviewed  Tests in the radiology section of CPT®: ordered and reviewed  Tests in the medicine section of CPT®: reviewed and ordered  Review and summarize past medical records: yes  Independent visualization of images, tracings, or specimens: yes    Risk of Complications, Morbidity, and/or Mortality  Presenting problems: moderate  Diagnostic procedures: low  Management options: low    Patient Progress  Patient progress: stable       Final diagnoses:   Scrotal hematoma        Disposition:  ED Disposition     ED Disposition Condition Comment    Discharge Stable           Documentation assistance provided by Chantell Hernandez acting as scribe for Sesar Veras MD. Information recorded by the scribe was done at my direction and has been verified and validated by me.          Chantell Hernandez  08/12/21 0036       Sesar Veras MD  08/12/21 [id number]

## 2021-08-17 LAB — BACTERIA SPEC AEROBE CULT: NORMAL

## 2021-10-21 ENCOUNTER — HOSPITAL ENCOUNTER (EMERGENCY)
Facility: HOSPITAL | Age: 36
Discharge: HOME | End: 2021-10-21
Attending: EMERGENCY MEDICINE | Admitting: EMERGENCY MEDICINE

## 2021-10-21 ENCOUNTER — APPOINTMENT (OUTPATIENT)
Dept: CT IMAGING | Facility: HOSPITAL | Age: 36
End: 2021-10-21

## 2021-10-21 VITALS
RESPIRATION RATE: 18 BRPM | TEMPERATURE: 98.3 F | WEIGHT: 267.42 LBS | BODY MASS INDEX: 36.22 KG/M2 | DIASTOLIC BLOOD PRESSURE: 92 MMHG | SYSTOLIC BLOOD PRESSURE: 162 MMHG | OXYGEN SATURATION: 96 % | HEIGHT: 72 IN | HEART RATE: 94 BPM

## 2021-10-21 DIAGNOSIS — L02.215 PERINEAL ABSCESS: Primary | ICD-10-CM

## 2021-10-21 LAB
ALBUMIN SERPL-MCNC: 4 G/DL (ref 3.5–5.2)
ALBUMIN/GLOB SERPL: 1.2 G/DL
ALP SERPL-CCNC: 113 U/L (ref 39–117)
ALT SERPL W P-5'-P-CCNC: 17 U/L (ref 1–41)
ANION GAP SERPL CALCULATED.3IONS-SCNC: 12.7 MMOL/L (ref 5–15)
AST SERPL-CCNC: 16 U/L (ref 1–40)
BASOPHILS # BLD AUTO: 0.05 10*3/MM3 (ref 0–0.2)
BASOPHILS NFR BLD AUTO: 0.3 % (ref 0–1.5)
BILIRUB SERPL-MCNC: 1 MG/DL (ref 0–1.2)
BUN SERPL-MCNC: 14 MG/DL (ref 6–20)
BUN/CREAT SERPL: 21.5 (ref 7–25)
CALCIUM SPEC-SCNC: 8.8 MG/DL (ref 8.6–10.5)
CHLORIDE SERPL-SCNC: 103 MMOL/L (ref 98–107)
CO2 SERPL-SCNC: 21.3 MMOL/L (ref 22–29)
CREAT SERPL-MCNC: 0.65 MG/DL (ref 0.76–1.27)
DEPRECATED RDW RBC AUTO: 43.9 FL (ref 37–54)
EOSINOPHIL # BLD AUTO: 0.17 10*3/MM3 (ref 0–0.4)
EOSINOPHIL NFR BLD AUTO: 1.2 % (ref 0.3–6.2)
ERYTHROCYTE [DISTWIDTH] IN BLOOD BY AUTOMATED COUNT: 13 % (ref 12.3–15.4)
GFR SERPL CREATININE-BSD FRML MDRD: 141 ML/MIN/1.73
GLOBULIN UR ELPH-MCNC: 3.4 GM/DL
GLUCOSE SERPL-MCNC: 152 MG/DL (ref 65–99)
HCT VFR BLD AUTO: 48.1 % (ref 37.5–51)
HGB BLD-MCNC: 16.8 G/DL (ref 13–17.7)
HOLD SPECIMEN: NORMAL
HOLD SPECIMEN: NORMAL
IMM GRANULOCYTES # BLD AUTO: 0.06 10*3/MM3 (ref 0–0.05)
IMM GRANULOCYTES NFR BLD AUTO: 0.4 % (ref 0–0.5)
LYMPHOCYTES # BLD AUTO: 2.65 10*3/MM3 (ref 0.7–3.1)
LYMPHOCYTES NFR BLD AUTO: 18 % (ref 19.6–45.3)
MCH RBC QN AUTO: 31.9 PG (ref 26.6–33)
MCHC RBC AUTO-ENTMCNC: 34.9 G/DL (ref 31.5–35.7)
MCV RBC AUTO: 91.3 FL (ref 79–97)
MONOCYTES # BLD AUTO: 0.68 10*3/MM3 (ref 0.1–0.9)
MONOCYTES NFR BLD AUTO: 4.6 % (ref 5–12)
NEUTROPHILS NFR BLD AUTO: 11.13 10*3/MM3 (ref 1.7–7)
NEUTROPHILS NFR BLD AUTO: 75.5 % (ref 42.7–76)
NRBC BLD AUTO-RTO: 0 /100 WBC (ref 0–0.2)
PLATELET # BLD AUTO: 227 10*3/MM3 (ref 140–450)
PMV BLD AUTO: 10.2 FL (ref 6–12)
POTASSIUM SERPL-SCNC: 3.7 MMOL/L (ref 3.5–5.2)
PROT SERPL-MCNC: 7.4 G/DL (ref 6–8.5)
RBC # BLD AUTO: 5.27 10*6/MM3 (ref 4.14–5.8)
SODIUM SERPL-SCNC: 137 MMOL/L (ref 136–145)
WBC # BLD AUTO: 14.74 10*3/MM3 (ref 3.4–10.8)
WHOLE BLOOD HOLD SPECIMEN: NORMAL
WHOLE BLOOD HOLD SPECIMEN: NORMAL

## 2021-10-21 PROCEDURE — 25010000002 KETOROLAC TROMETHAMINE PER 15 MG: Performed by: PHYSICIAN ASSISTANT

## 2021-10-21 PROCEDURE — 80053 COMPREHEN METABOLIC PANEL: CPT | Performed by: PHYSICIAN ASSISTANT

## 2021-10-21 PROCEDURE — 99283 EMERGENCY DEPT VISIT LOW MDM: CPT

## 2021-10-21 PROCEDURE — 96374 THER/PROPH/DIAG INJ IV PUSH: CPT

## 2021-10-21 PROCEDURE — 0 IOPAMIDOL PER 1 ML: Performed by: EMERGENCY MEDICINE

## 2021-10-21 PROCEDURE — 51798 US URINE CAPACITY MEASURE: CPT

## 2021-10-21 PROCEDURE — 74177 CT ABD & PELVIS W/CONTRAST: CPT

## 2021-10-21 PROCEDURE — 85025 COMPLETE CBC W/AUTO DIFF WBC: CPT | Performed by: PHYSICIAN ASSISTANT

## 2021-10-21 RX ORDER — SODIUM CHLORIDE 0.9 % (FLUSH) 0.9 %
10 SYRINGE (ML) INJECTION AS NEEDED
Status: DISCONTINUED | OUTPATIENT
Start: 2021-10-21 | End: 2021-10-21 | Stop reason: HOSPADM

## 2021-10-21 RX ORDER — KETOROLAC TROMETHAMINE 15 MG/ML
15 INJECTION, SOLUTION INTRAMUSCULAR; INTRAVENOUS ONCE
Status: COMPLETED | OUTPATIENT
Start: 2021-10-21 | End: 2021-10-21

## 2021-10-21 RX ORDER — KETOROLAC TROMETHAMINE 10 MG/1
10 TABLET, FILM COATED ORAL EVERY 6 HOURS PRN
Qty: 12 TABLET | Refills: 0 | Status: ON HOLD | OUTPATIENT
Start: 2021-10-21 | End: 2021-12-16

## 2021-10-21 RX ORDER — SULFAMETHOXAZOLE AND TRIMETHOPRIM 800; 160 MG/1; MG/1
1 TABLET ORAL 2 TIMES DAILY
Qty: 14 TABLET | Refills: 0 | Status: SHIPPED | OUTPATIENT
Start: 2021-10-21 | End: 2021-10-28

## 2021-10-21 RX ADMIN — IOPAMIDOL 100 ML: 755 INJECTION, SOLUTION INTRAVENOUS at 11:35

## 2021-10-21 RX ADMIN — KETOROLAC TROMETHAMINE 15 MG: 15 INJECTION, SOLUTION INTRAMUSCULAR; INTRAVENOUS at 11:17

## 2021-10-21 NOTE — ED PROVIDER NOTES
"Subjective   35-year-old male presents to the emergency department with complaints of perineal abscess which is been present for over 2 months.  Patient states he was seen initially here in the emergency department on 8/11/2021, he was prescribed antibiotics and discharged at that time.  He states he has noted some improvement of the area, but states it still present and it is concerning to him because it is painful.  Patient rates pain 8 out of 10.  He denies any fevers or chills.  He denies any other systemic symptoms or concerns at this time.  Patient showed me photos on his phone of drainage present this morning, stating \"I opened it myself this morning and milked it the best I could.\" Resting comfortably in the room after administration of Toradol, imaging and ideal be performed at this time.  Patient verbalized understanding agreement with this plan.      History provided by:  Patient   used: No        Review of Systems   Constitutional: Negative for chills and fever.   HENT: Negative for congestion, ear pain and sore throat.    Eyes: Negative for pain.   Respiratory: Negative for cough, chest tightness and shortness of breath.    Cardiovascular: Negative for chest pain.   Gastrointestinal: Negative for abdominal pain, diarrhea, nausea and vomiting.   Genitourinary: Negative for dysuria, flank pain, frequency, hematuria, penile discharge, penile pain, penile swelling, scrotal swelling, testicular pain and urgency.   Musculoskeletal: Negative for joint swelling.   Skin: Negative for pallor.        Perianal abscess   Neurological: Negative for seizures and headaches.   All other systems reviewed and are negative.      Past Medical History:   Diagnosis Date   • Carpal tunnel syndrome 04/20/2021   • Cervical radiculopathy 04/20/2021   • Cervicalgia    • Low back pain 04/20/2021   • Lumbar radiculopathy 04/20/2021       No Known Allergies    Past Surgical History:   Procedure Laterality Date   • " CARPAL TUNNEL RELEASE Right 04/30/2021    [facility]       Family History   Problem Relation Age of Onset   • Diabetes Other         UNSPECIFIED       Social History     Socioeconomic History   • Marital status:    Tobacco Use   • Smoking status: Current Every Day Smoker     Packs/day: 0.50     Types: Cigars           Objective   Physical Exam  Vitals and nursing note reviewed.   Constitutional:       General: He is not in acute distress.     Appearance: Normal appearance. He is not toxic-appearing.   HENT:      Head: Normocephalic and atraumatic.      Mouth/Throat:      Mouth: Mucous membranes are moist.   Eyes:      General: No scleral icterus.  Cardiovascular:      Rate and Rhythm: Normal rate and regular rhythm.      Pulses: Normal pulses.      Heart sounds: Normal heart sounds.   Pulmonary:      Effort: Pulmonary effort is normal. No respiratory distress.      Breath sounds: Normal breath sounds.   Abdominal:      General: Abdomen is flat.      Palpations: Abdomen is soft.      Tenderness: There is no abdominal tenderness.   Genitourinary:     Penis: Normal.       Testes: Normal.      Epididymis:      Right: Normal.      Left: Normal.   Musculoskeletal:         General: Normal range of motion.      Cervical back: Normal range of motion and neck supple.   Skin:     General: Skin is warm and dry.      Findings: Abscess present.             Comments:  system otherwise unremarkable, no redness or tenderness elsewhere including the penis or testicles.  Area is localized appearing, CT to confirm.   Neurological:      Mental Status: He is alert and oriented to person, place, and time. Mental status is at baseline.         Incision & Drainage    Date/Time: 10/21/2021 3:13 PM  Performed by: Darlin Ward PA-C  Authorized by: Jono Adams DO     Consent:     Consent obtained:  Verbal    Consent given by:  Patient    Risks discussed:  Bleeding, damage to other organs, incomplete drainage, infection and  "pain  Location:     Type:  Abscess    Size:  2 x 2 x1 cm    Location:  Anogenital    Anogenital location:  Perineum  Pre-procedure details:     Skin preparation:  Betadine and Hibiclens  Anesthesia (see MAR for exact dosages):     Anesthesia method:  Local infiltration    Local anesthetic:  Lidocaine 1% WITH epi  Procedure type:     Complexity:  Simple  Procedure details:     Needle aspiration: no      Incision types:  Stab incision and single straight    Incision depth:  Subcutaneous    Scalpel blade:  11    Wound management:  Probed and deloculated, irrigated with saline, extensive cleaning and debrided    Drainage:  Bloody, purulent and serosanguinous    Drainage amount:  Moderate    Wound treatment:  Wound left open    Packing materials:  1/2 in iodoform gauze    Amount 1/2\" iodoform:  6 cm  Post-procedure details:     Patient tolerance of procedure:  Tolerated well, no immediate complications               ED Course  ED Course as of 10/21/21 1516   Thu Oct 21, 2021   1129 Bladder scan: 50 cc  [KF]      ED Course User Index  [KF] Darlin Ward PA-C                                             MDM  Number of Diagnoses or Management Options  Diagnosis management comments: Mr. Casillas was seen and evaluated myself at this time for concerns over continued perianal abscess. CBC at this time revealed mild leukocytosis, improved from previous visit.  CMP and other blood work otherwise unremarkable.  CT at this time revealed perianal abscess approximately 2.8 x 2.3 x 1.3 cm.  I informed the patient of these findings, he agreed verbally to I&D performed at bedside.  Patient tolerated procedure well, moderate drainage appeared after deloculation and debridement.  Patiently placed on oral antibiotics outpatient, given information to follow-up with general surgery.  Patient verbalized understanding and agreement with the following treatment plan.  Vital stable upon intake and prior to discharge.  Patient resting " comfortably, complaining of improvement of symptoms.  He denies any other complaints or concerns.  He is appropriate for discharge at this time with outpatient follow-up.  He should return to the emergency department in 24 to 48 hours for wound repacking and recheck.  Otherwise, he should follow-up primary care provider for further evaluation and management.       Amount and/or Complexity of Data Reviewed  Clinical lab tests: reviewed and ordered  Tests in the radiology section of CPT®: reviewed and ordered  Decide to obtain previous medical records or to obtain history from someone other than the patient: yes    Risk of Complications, Morbidity, and/or Mortality  Presenting problems: low  Diagnostic procedures: low  Management options: low    Patient Progress  Patient progress: stable      Final diagnoses:   Perineal abscess       ED Disposition  ED Disposition     ED Disposition Condition Comment    Discharge Stable           Sriram Sullivan MD  [facility]  [address]  [place] [id number] [phone]      As needed, If symptoms worsen for general surgery consult         Medication List      New Prescriptions    ketorolac 10 MG tablet  Commonly known as: TORADOL  Take 1 tablet by mouth Every 6 (Six) Hours As Needed for Moderate Pain .     sulfamethoxazole-trimethoprim 800-160 MG per tablet  Commonly known as: BACTRIM DS,SEPTRA DS  Take 1 tablet by mouth 2 (Two) Times a Day for 7 days.           Where to Get Your Medications      These medications were sent to [id number] - [address] AT [facility] - [phone]  - [phone] [address] [address]    Phone: [phone]   · ketorolac 10 MG tablet  · sulfamethoxazole-trimethoprim 800-160 MG per tablet          Darlin Ward PA-C  10/21/21 [id number]

## 2021-10-25 ENCOUNTER — HOSPITAL ENCOUNTER (EMERGENCY)
Facility: HOSPITAL | Age: 36
Discharge: HOME | End: 2021-10-25
Attending: EMERGENCY MEDICINE | Admitting: EMERGENCY MEDICINE

## 2021-10-25 VITALS
TEMPERATURE: 98.5 F | BODY MASS INDEX: 36.16 KG/M2 | HEART RATE: 63 BPM | HEIGHT: 72 IN | SYSTOLIC BLOOD PRESSURE: 142 MMHG | DIASTOLIC BLOOD PRESSURE: 83 MMHG | RESPIRATION RATE: 18 BRPM | OXYGEN SATURATION: 100 % | WEIGHT: 267 LBS

## 2021-10-25 DIAGNOSIS — Z51.89 WOUND CHECK, ABSCESS: Primary | ICD-10-CM

## 2021-10-25 PROCEDURE — 99282 EMERGENCY DEPT VISIT SF MDM: CPT

## 2021-10-25 NOTE — ED PROVIDER NOTES
Subjective   Patient presents emergency department for wound check.  Patient states did have an abscess located behind his scrotum for several days.  Patient states that it had ruptured and was draining.  Patient was seen in the emergency department and started on antibiotics.  Patient states since that time symptoms have drastically improved.  And, currently he has no concerns and/or issues.  Patient states will continue his antibiotics and return to the emergency department if symptoms and/or condition worsens.      History provided by:  Patient   used: No    Wound Check  Location:  Peritoneal  Quality:  Patient states area of concern is much improved.  Severity:  Mild  Onset quality:  Sudden  Context:  Patient states here for abscess check  Associated symptoms: no abdominal pain, no chest pain, no congestion, no cough, no diarrhea, no ear pain, no fatigue, no fever, no headaches, no loss of consciousness, no myalgias, no nausea, no rash, no rhinorrhea, no shortness of breath, no sore throat, no vomiting and no wheezing        Review of Systems   Constitutional: Negative for chills, fatigue and fever.   HENT: Negative for congestion, ear pain, rhinorrhea and sore throat.    Eyes: Negative for pain.   Respiratory: Negative for cough, chest tightness, shortness of breath and wheezing.    Cardiovascular: Negative for chest pain.   Gastrointestinal: Negative for abdominal pain, diarrhea, nausea and vomiting.   Genitourinary: Negative for flank pain and hematuria.   Musculoskeletal: Negative for joint swelling and myalgias.   Skin: Negative for pallor and rash.   Neurological: Negative for seizures, loss of consciousness and headaches.   Psychiatric/Behavioral: Negative.    All other systems reviewed and are negative.      Past Medical History:   Diagnosis Date   • Carpal tunnel syndrome 04/20/2021   • Cervical radiculopathy 04/20/2021   • Cervicalgia    • Low back pain 04/20/2021   • Lumbar  radiculopathy 04/20/2021       No Known Allergies    Past Surgical History:   Procedure Laterality Date   • CARPAL TUNNEL RELEASE Right 04/30/2021    [facility]       Family History   Problem Relation Age of Onset   • Diabetes Other         UNSPECIFIED       Social History     Socioeconomic History   • Marital status:    Tobacco Use   • Smoking status: Current Every Day Smoker     Packs/day: 0.50     Types: Cigars           Objective   Physical Exam  Vitals and nursing note reviewed.   Constitutional:       General: He is not in acute distress.     Appearance: Normal appearance. He is not toxic-appearing.   HENT:      Head: Normocephalic and atraumatic.      Mouth/Throat:      Mouth: Mucous membranes are moist.   Eyes:      General: No scleral icterus.     Extraocular Movements: Extraocular movements intact.      Pupils: Pupils are equal, round, and reactive to light.   Cardiovascular:      Rate and Rhythm: Normal rate and regular rhythm.      Pulses: Normal pulses.      Heart sounds: Normal heart sounds.   Pulmonary:      Effort: Pulmonary effort is normal. No respiratory distress.      Breath sounds: Normal breath sounds.   Abdominal:      General: Abdomen is flat.      Palpations: Abdomen is soft.      Tenderness: There is no abdominal tenderness.   Genitourinary:     Comments: Small incision noted with scant amount of serosanguineous drainage noted to peritoneal.  No cellulitis noted.  No point tenderness.  No abscess.  Musculoskeletal:         General: Normal range of motion.      Cervical back: Normal range of motion and neck supple.   Skin:     General: Skin is warm and dry.   Neurological:      Mental Status: He is alert and oriented to person, place, and time. Mental status is at baseline.   Psychiatric:         Mood and Affect: Mood normal.         Behavior: Behavior normal.         Thought Content: Thought content normal.         Judgment: Judgment normal.         Procedures           ED Course                                            MDM  Number of Diagnoses or Management Options  Wound check, abscess  Diagnosis management comments: I have spoken with Mr. Casillas. I have explained the patient´s condition, diagnoses and treatment plan based on the information available to me at this time. I have answered the patient's questions and addressed any concerns. The patient has a good  understanding of the patient´s diagnosis, condition, and treatment plan as can be expected at this point. The vital signs have been stable. The patient´s condition is stable and appropriate for discharge from the emergency department.      The patient will pursue further outpatient evaluation with the primary care physician or other designated or consulting physician as outlined in the discharge instructions. They are agreeable to this plan of care and follow-up instructions have been explained in detail. The patient has received these instructions in written format and have expressed an understanding of the discharge instructions. The patient is aware that any significant change in condition or worsening of symptoms should prompt an immediate return to this or the closest emergency department or call to 911.    Risk of Complications, Morbidity, and/or Mortality  Presenting problems: low  Diagnostic procedures: low  Management options: low    Patient Progress  Patient progress: stable      Final diagnoses:   Wound check, abscess       ED Disposition  ED Disposition     ED Disposition Condition Comment    Discharge Stable           Provider, No Known  [facility]  [place] [address]    Schedule an appointment as soon as possible for a visit in 3 days           Medication List      No changes were made to your prescriptions during this visit.          Cesar Beltran, APRN  10/25/21 154

## 2021-10-25 NOTE — DISCHARGE INSTRUCTIONS
Continue your antibiotics as directed.  Return to the emergency department if condition worsens or any additional symptoms and/or concerns.

## 2021-12-12 PROBLEM — L02.215 PERINEAL ABSCESS: Status: ACTIVE | Noted: 2021-12-12

## 2021-12-15 ENCOUNTER — HOSPITAL ENCOUNTER (INPATIENT)
Facility: HOSPITAL | Age: 36
LOS: 1 days | Discharge: HOME | End: 2021-12-16
Attending: EMERGENCY MEDICINE | Admitting: SURGERY

## 2021-12-15 ENCOUNTER — APPOINTMENT (OUTPATIENT)
Dept: CT IMAGING | Facility: HOSPITAL | Age: 36
End: 2021-12-15

## 2021-12-15 ENCOUNTER — ANESTHESIA EVENT (OUTPATIENT)
Dept: PERIOP | Facility: HOSPITAL | Age: 36
End: 2021-12-15

## 2021-12-15 ENCOUNTER — APPOINTMENT (OUTPATIENT)
Dept: GENERAL RADIOLOGY | Facility: HOSPITAL | Age: 36
End: 2021-12-15

## 2021-12-15 ENCOUNTER — ANESTHESIA (OUTPATIENT)
Dept: PERIOP | Facility: HOSPITAL | Age: 36
End: 2021-12-15

## 2021-12-15 DIAGNOSIS — Z90.49 S/P LAPAROSCOPIC CHOLECYSTECTOMY: ICD-10-CM

## 2021-12-15 DIAGNOSIS — K81.0 ACUTE CHOLECYSTITIS: Primary | ICD-10-CM

## 2021-12-15 LAB
ALBUMIN SERPL-MCNC: 4.4 G/DL (ref 3.5–5.2)
ALBUMIN/GLOB SERPL: 1.3 G/DL
ALP SERPL-CCNC: 103 U/L (ref 39–117)
ALT SERPL W P-5'-P-CCNC: 18 U/L (ref 1–41)
ANION GAP SERPL CALCULATED.3IONS-SCNC: 11.3 MMOL/L (ref 5–15)
ARTERIAL PATENCY WRIST A: POSITIVE
AST SERPL-CCNC: 14 U/L (ref 1–40)
BASE EXCESS BLDA CALC-SCNC: -1 MMOL/L (ref -2–2)
BASOPHILS # BLD AUTO: 0.07 10*3/MM3 (ref 0–0.2)
BASOPHILS NFR BLD AUTO: 0.3 % (ref 0–1.5)
BDY SITE: ABNORMAL
BILIRUB SERPL-MCNC: 0.9 MG/DL (ref 0–1.2)
BUN SERPL-MCNC: 18 MG/DL (ref 6–20)
BUN/CREAT SERPL: 26.1 (ref 7–25)
CA-I BLDA-SCNC: 1.15 MMOL/L (ref 1.13–1.32)
CALCIUM SPEC-SCNC: 9.8 MG/DL (ref 8.6–10.5)
CHLORIDE BLDA-SCNC: 102 MMOL/L (ref 98–106)
CHLORIDE SERPL-SCNC: 101 MMOL/L (ref 98–107)
CK MB SERPL-CCNC: 1.17 NG/ML
CK SERPL-CCNC: 60 U/L (ref 20–200)
CO2 SERPL-SCNC: 24.7 MMOL/L (ref 22–29)
COHGB MFR BLD: 8.4 % (ref 0–1.5)
CREAT SERPL-MCNC: 0.69 MG/DL (ref 0.76–1.27)
DEPRECATED RDW RBC AUTO: 45.2 FL (ref 37–54)
EOSINOPHIL # BLD AUTO: 0.1 10*3/MM3 (ref 0–0.4)
EOSINOPHIL NFR BLD AUTO: 0.5 % (ref 0.3–6.2)
ERYTHROCYTE [DISTWIDTH] IN BLOOD BY AUTOMATED COUNT: 13.4 % (ref 12.3–15.4)
FHHB: 1.2 % (ref 0–5)
GAS FLOW AIRWAY: 15 LPM
GFR SERPL CREATININE-BSD FRML MDRD: 131 ML/MIN/1.73
GLOBULIN UR ELPH-MCNC: 3.4 GM/DL
GLUCOSE BLDA-MCNC: 136 MMOL/L (ref 70–99)
GLUCOSE BLDC GLUCOMTR-MCNC: 121 MG/DL (ref 70–99)
GLUCOSE SERPL-MCNC: 140 MG/DL (ref 65–99)
HCO3 BLDA-SCNC: 21.6 MMOL/L (ref 22–26)
HCT VFR BLD AUTO: 47.4 % (ref 37.5–51)
HGB BLD-MCNC: 16.5 G/DL (ref 13–17.7)
HGB BLDA-MCNC: 17.2 G/DL (ref 13.8–16.4)
HOLD SPECIMEN: NORMAL
HOLD SPECIMEN: NORMAL
IMM GRANULOCYTES # BLD AUTO: 0.11 10*3/MM3 (ref 0–0.05)
IMM GRANULOCYTES NFR BLD AUTO: 0.5 % (ref 0–0.5)
INHALED O2 CONCENTRATION: 100 %
LACTATE BLDA-SCNC: 0.84 MMOL/L (ref 0.5–2)
LIPASE SERPL-CCNC: 13 U/L (ref 13–60)
LYMPHOCYTES # BLD AUTO: 2.31 10*3/MM3 (ref 0.7–3.1)
LYMPHOCYTES NFR BLD AUTO: 11.3 % (ref 19.6–45.3)
MAGNESIUM SERPL-MCNC: 2.1 MG/DL (ref 1.6–2.6)
MCH RBC QN AUTO: 31.6 PG (ref 26.6–33)
MCHC RBC AUTO-ENTMCNC: 34.8 G/DL (ref 31.5–35.7)
MCV RBC AUTO: 90.8 FL (ref 79–97)
METHGB BLD QL: 0.1 % (ref 0–1.5)
MODALITY: ABNORMAL
MONOCYTES # BLD AUTO: 0.78 10*3/MM3 (ref 0.1–0.9)
MONOCYTES NFR BLD AUTO: 3.8 % (ref 5–12)
NEUTROPHILS NFR BLD AUTO: 17.07 10*3/MM3 (ref 1.7–7)
NEUTROPHILS NFR BLD AUTO: 83.6 % (ref 42.7–76)
NOTE: ABNORMAL
NRBC BLD AUTO-RTO: 0 /100 WBC (ref 0–0.2)
NT-PROBNP SERPL-MCNC: 39.8 PG/ML (ref 0–450)
OXYHGB MFR BLDV: 90.3 % (ref 94–99)
PCO2 BLDA: 31.3 MM HG (ref 35–45)
PH BLDA: 7.46 PH UNITS (ref 7.35–7.45)
PLATELET # BLD AUTO: 225 10*3/MM3 (ref 140–450)
PMV BLD AUTO: 10.6 FL (ref 6–12)
PO2 BLD: 207 MM[HG] (ref 0–500)
PO2 BLDA: 206.6 MM HG (ref 80–100)
POTASSIUM BLDA-SCNC: 3.56 MMOL/L (ref 3.5–5)
POTASSIUM SERPL-SCNC: 3.8 MMOL/L (ref 3.5–5.2)
PROT SERPL-MCNC: 7.8 G/DL (ref 6–8.5)
RBC # BLD AUTO: 5.22 10*6/MM3 (ref 4.14–5.8)
SAO2 % BLDCOA: 98.7 % (ref 95–99)
SARS-COV-2 RNA RESP QL NAA+PROBE: NOT DETECTED
SODIUM BLDA-SCNC: 137.1 MMOL/L (ref 136–146)
SODIUM SERPL-SCNC: 137 MMOL/L (ref 136–145)
TROPONIN I SERPL-MCNC: 0 NG/ML (ref 0–0.6)
TROPONIN I SERPL-MCNC: 0 NG/ML (ref 0–0.6)
WBC NRBC COR # BLD: 20.44 10*3/MM3 (ref 3.4–10.8)
WHOLE BLOOD HOLD SPECIMEN: NORMAL

## 2021-12-15 PROCEDURE — 82553 CREATINE MB FRACTION: CPT | Performed by: EMERGENCY MEDICINE

## 2021-12-15 PROCEDURE — 25010000002 DIPHENHYDRAMINE PER 50 MG: Performed by: EMERGENCY MEDICINE

## 2021-12-15 PROCEDURE — 25010000002 DEXAMETHASONE PER 1 MG: Performed by: NURSE ANESTHETIST, CERTIFIED REGISTERED

## 2021-12-15 PROCEDURE — U0003 INFECTIOUS AGENT DETECTION BY NUCLEIC ACID (DNA OR RNA); SEVERE ACUTE RESPIRATORY SYNDROME CORONAVIRUS 2 (SARS-COV-2) (CORONAVIRUS DISEASE [COVID-19]), AMPLIFIED PROBE TECHNIQUE, MAKING USE OF HIGH THROUGHPUT TECHNOLOGIES AS DESCRIBED BY CMS-2020-01-R: HCPCS | Performed by: SURGERY

## 2021-12-15 PROCEDURE — 83735 ASSAY OF MAGNESIUM: CPT | Performed by: EMERGENCY MEDICINE

## 2021-12-15 PROCEDURE — 84484 ASSAY OF TROPONIN QUANT: CPT

## 2021-12-15 PROCEDURE — 0FT44ZZ RESECTION OF GALLBLADDER, PERCUTANEOUS ENDOSCOPIC APPROACH: ICD-10-PCS | Performed by: SURGERY

## 2021-12-15 PROCEDURE — 47562 LAPAROSCOPIC CHOLECYSTECTOMY: CPT | Performed by: SURGERY

## 2021-12-15 PROCEDURE — C1889 IMPLANT/INSERT DEVICE, NOC: HCPCS | Performed by: SURGERY

## 2021-12-15 PROCEDURE — 82805 BLOOD GASES W/O2 SATURATION: CPT | Performed by: EMERGENCY MEDICINE

## 2021-12-15 PROCEDURE — 74177 CT ABD & PELVIS W/CONTRAST: CPT

## 2021-12-15 PROCEDURE — 80053 COMPREHEN METABOLIC PANEL: CPT | Performed by: EMERGENCY MEDICINE

## 2021-12-15 PROCEDURE — 82550 ASSAY OF CK (CPK): CPT | Performed by: EMERGENCY MEDICINE

## 2021-12-15 PROCEDURE — 93010 ELECTROCARDIOGRAM REPORT: CPT | Performed by: INTERNAL MEDICINE

## 2021-12-15 PROCEDURE — 82375 ASSAY CARBOXYHB QUANT: CPT | Performed by: EMERGENCY MEDICINE

## 2021-12-15 PROCEDURE — 83050 HGB METHEMOGLOBIN QUAN: CPT | Performed by: EMERGENCY MEDICINE

## 2021-12-15 PROCEDURE — 85025 COMPLETE CBC W/AUTO DIFF WBC: CPT | Performed by: EMERGENCY MEDICINE

## 2021-12-15 PROCEDURE — 0 IOPAMIDOL PER 1 ML: Performed by: EMERGENCY MEDICINE

## 2021-12-15 PROCEDURE — 25010000002 ONDANSETRON PER 1 MG: Performed by: EMERGENCY MEDICINE

## 2021-12-15 PROCEDURE — 25010000002 HYDROMORPHONE 1 MG/ML SOLUTION: Performed by: EMERGENCY MEDICINE

## 2021-12-15 PROCEDURE — 93005 ELECTROCARDIOGRAM TRACING: CPT | Performed by: EMERGENCY MEDICINE

## 2021-12-15 PROCEDURE — 25010000002 ONDANSETRON PER 1 MG: Performed by: NURSE ANESTHETIST, CERTIFIED REGISTERED

## 2021-12-15 PROCEDURE — 36600 WITHDRAWAL OF ARTERIAL BLOOD: CPT | Performed by: EMERGENCY MEDICINE

## 2021-12-15 PROCEDURE — 25010000002 PROPOFOL 10 MG/ML EMULSION: Performed by: NURSE ANESTHETIST, CERTIFIED REGISTERED

## 2021-12-15 PROCEDURE — 83690 ASSAY OF LIPASE: CPT | Performed by: EMERGENCY MEDICINE

## 2021-12-15 PROCEDURE — 99285 EMERGENCY DEPT VISIT HI MDM: CPT

## 2021-12-15 PROCEDURE — 47562 LAPAROSCOPIC CHOLECYSTECTOMY: CPT | Performed by: SPECIALIST/TECHNOLOGIST, OTHER

## 2021-12-15 PROCEDURE — 25010000002 KETOROLAC TROMETHAMINE PER 15 MG: Performed by: NURSE ANESTHETIST, CERTIFIED REGISTERED

## 2021-12-15 PROCEDURE — 25010000002 CEFAZOLIN PER 500 MG: Performed by: SURGERY

## 2021-12-15 PROCEDURE — 25010000002 FENTANYL CITRATE (PF) 50 MCG/ML SOLUTION: Performed by: NURSE ANESTHETIST, CERTIFIED REGISTERED

## 2021-12-15 PROCEDURE — 88304 TISSUE EXAM BY PATHOLOGIST: CPT | Performed by: SURGERY

## 2021-12-15 PROCEDURE — 82962 GLUCOSE BLOOD TEST: CPT

## 2021-12-15 PROCEDURE — 25010000002 HYDROMORPHONE PER 4 MG: Performed by: NURSE ANESTHETIST, CERTIFIED REGISTERED

## 2021-12-15 PROCEDURE — 25010000002 METOCLOPRAMIDE PER 10 MG: Performed by: EMERGENCY MEDICINE

## 2021-12-15 PROCEDURE — 71045 X-RAY EXAM CHEST 1 VIEW: CPT

## 2021-12-15 PROCEDURE — 25010000002 MIDAZOLAM PER 1MG: Performed by: ANESTHESIOLOGY

## 2021-12-15 PROCEDURE — 83880 ASSAY OF NATRIURETIC PEPTIDE: CPT | Performed by: EMERGENCY MEDICINE

## 2021-12-15 PROCEDURE — 71275 CT ANGIOGRAPHY CHEST: CPT

## 2021-12-15 DEVICE — LIGACLIP 10-M/L, 10MM ENDOSCOPIC ROTATING MULTIPLE CLIP APPLIERS
Type: IMPLANTABLE DEVICE | Site: ABDOMEN | Status: FUNCTIONAL
Brand: LIGACLIP

## 2021-12-15 RX ORDER — ONDANSETRON 2 MG/ML
4 INJECTION INTRAMUSCULAR; INTRAVENOUS EVERY 6 HOURS PRN
Status: DISCONTINUED | OUTPATIENT
Start: 2021-12-15 | End: 2021-12-16 | Stop reason: HOSPADM

## 2021-12-15 RX ORDER — ONDANSETRON 2 MG/ML
4 INJECTION INTRAMUSCULAR; INTRAVENOUS ONCE
Status: COMPLETED | OUTPATIENT
Start: 2021-12-15 | End: 2021-12-15

## 2021-12-15 RX ORDER — SODIUM CHLORIDE 0.9 % (FLUSH) 0.9 %
10 SYRINGE (ML) INJECTION AS NEEDED
Status: DISCONTINUED | OUTPATIENT
Start: 2021-12-15 | End: 2021-12-16 | Stop reason: HOSPADM

## 2021-12-15 RX ORDER — PROPOFOL 10 MG/ML
VIAL (ML) INTRAVENOUS AS NEEDED
Status: DISCONTINUED | OUTPATIENT
Start: 2021-12-15 | End: 2021-12-15 | Stop reason: SURG

## 2021-12-15 RX ORDER — PROMETHAZINE HYDROCHLORIDE 25 MG/1
25 SUPPOSITORY RECTAL ONCE AS NEEDED
Status: DISCONTINUED | OUTPATIENT
Start: 2021-12-15 | End: 2021-12-15 | Stop reason: HOSPADM

## 2021-12-15 RX ORDER — DEXMEDETOMIDINE HYDROCHLORIDE 100 UG/ML
INJECTION, SOLUTION INTRAVENOUS AS NEEDED
Status: DISCONTINUED | OUTPATIENT
Start: 2021-12-15 | End: 2021-12-15 | Stop reason: SURG

## 2021-12-15 RX ORDER — KETOROLAC TROMETHAMINE 15 MG/ML
15 INJECTION, SOLUTION INTRAMUSCULAR; INTRAVENOUS EVERY 6 HOURS PRN
Status: DISCONTINUED | OUTPATIENT
Start: 2021-12-15 | End: 2021-12-16 | Stop reason: HOSPADM

## 2021-12-15 RX ORDER — SODIUM CHLORIDE 0.9 % (FLUSH) 0.9 %
10 SYRINGE (ML) INJECTION AS NEEDED
Status: DISCONTINUED | OUTPATIENT
Start: 2021-12-15 | End: 2021-12-15 | Stop reason: HOSPADM

## 2021-12-15 RX ORDER — BUPIVACAINE HYDROCHLORIDE AND EPINEPHRINE 2.5; 5 MG/ML; UG/ML
INJECTION, SOLUTION EPIDURAL; INFILTRATION; INTRACAUDAL; PERINEURAL AS NEEDED
Status: DISCONTINUED | OUTPATIENT
Start: 2021-12-15 | End: 2021-12-15 | Stop reason: HOSPADM

## 2021-12-15 RX ORDER — CEFAZOLIN SODIUM IN 0.9 % NACL 3 G/100 ML
3 INTRAVENOUS SOLUTION, PIGGYBACK (ML) INTRAVENOUS ONCE
Status: COMPLETED | OUTPATIENT
Start: 2021-12-15 | End: 2021-12-15

## 2021-12-15 RX ORDER — NALOXONE HCL 0.4 MG/ML
0.4 VIAL (ML) INJECTION
Status: DISCONTINUED | OUTPATIENT
Start: 2021-12-15 | End: 2021-12-16 | Stop reason: HOSPADM

## 2021-12-15 RX ORDER — ACETAMINOPHEN 325 MG/1
650 TABLET ORAL EVERY 4 HOURS PRN
Status: DISCONTINUED | OUTPATIENT
Start: 2021-12-15 | End: 2021-12-16 | Stop reason: HOSPADM

## 2021-12-15 RX ORDER — PANTOPRAZOLE SODIUM 40 MG/10ML
40 INJECTION, POWDER, LYOPHILIZED, FOR SOLUTION INTRAVENOUS ONCE
Status: COMPLETED | OUTPATIENT
Start: 2021-12-15 | End: 2021-12-15

## 2021-12-15 RX ORDER — SODIUM CHLORIDE, SODIUM LACTATE, POTASSIUM CHLORIDE, CALCIUM CHLORIDE 600; 310; 30; 20 MG/100ML; MG/100ML; MG/100ML; MG/100ML
9 INJECTION, SOLUTION INTRAVENOUS CONTINUOUS PRN
Status: DISCONTINUED | OUTPATIENT
Start: 2021-12-15 | End: 2021-12-15 | Stop reason: HOSPADM

## 2021-12-15 RX ORDER — ROCURONIUM BROMIDE 10 MG/ML
INJECTION, SOLUTION INTRAVENOUS AS NEEDED
Status: DISCONTINUED | OUTPATIENT
Start: 2021-12-15 | End: 2021-12-15 | Stop reason: SURG

## 2021-12-15 RX ORDER — MIDAZOLAM HYDROCHLORIDE 2 MG/2ML
2 INJECTION, SOLUTION INTRAMUSCULAR; INTRAVENOUS ONCE
Status: COMPLETED | OUTPATIENT
Start: 2021-12-15 | End: 2021-12-15

## 2021-12-15 RX ORDER — KETOROLAC TROMETHAMINE 30 MG/ML
INJECTION, SOLUTION INTRAMUSCULAR; INTRAVENOUS AS NEEDED
Status: DISCONTINUED | OUTPATIENT
Start: 2021-12-15 | End: 2021-12-15 | Stop reason: SURG

## 2021-12-15 RX ORDER — SODIUM CHLORIDE, SODIUM LACTATE, POTASSIUM CHLORIDE, CALCIUM CHLORIDE 600; 310; 30; 20 MG/100ML; MG/100ML; MG/100ML; MG/100ML
70 INJECTION, SOLUTION INTRAVENOUS CONTINUOUS
Status: DISCONTINUED | OUTPATIENT
Start: 2021-12-15 | End: 2021-12-15 | Stop reason: HOSPADM

## 2021-12-15 RX ORDER — HYDROCODONE BITARTRATE AND ACETAMINOPHEN 5; 325 MG/1; MG/1
1 TABLET ORAL EVERY 4 HOURS PRN
Status: DISCONTINUED | OUTPATIENT
Start: 2021-12-15 | End: 2021-12-16 | Stop reason: HOSPADM

## 2021-12-15 RX ORDER — ACETAMINOPHEN 650 MG/1
650 SUPPOSITORY RECTAL EVERY 4 HOURS PRN
Status: DISCONTINUED | OUTPATIENT
Start: 2021-12-15 | End: 2021-12-16 | Stop reason: HOSPADM

## 2021-12-15 RX ORDER — ONDANSETRON 2 MG/ML
4 INJECTION INTRAMUSCULAR; INTRAVENOUS ONCE AS NEEDED
Status: DISCONTINUED | OUTPATIENT
Start: 2021-12-15 | End: 2021-12-15 | Stop reason: HOSPADM

## 2021-12-15 RX ORDER — PROMETHAZINE HYDROCHLORIDE 12.5 MG/1
25 TABLET ORAL ONCE AS NEEDED
Status: DISCONTINUED | OUTPATIENT
Start: 2021-12-15 | End: 2021-12-15 | Stop reason: HOSPADM

## 2021-12-15 RX ORDER — SODIUM CHLORIDE 0.9 % (FLUSH) 0.9 %
10 SYRINGE (ML) INJECTION EVERY 12 HOURS SCHEDULED
Status: DISCONTINUED | OUTPATIENT
Start: 2021-12-15 | End: 2021-12-16 | Stop reason: HOSPADM

## 2021-12-15 RX ORDER — ASPIRIN 81 MG/1
324 TABLET, CHEWABLE ORAL ONCE
Status: DISCONTINUED | OUTPATIENT
Start: 2021-12-15 | End: 2021-12-15 | Stop reason: HOSPADM

## 2021-12-15 RX ORDER — INDOCYANINE GREEN AND WATER 25 MG
1.25 KIT INJECTION ONCE
Status: COMPLETED | OUTPATIENT
Start: 2021-12-15 | End: 2021-12-15

## 2021-12-15 RX ORDER — DOCUSATE SODIUM 100 MG/1
100 CAPSULE, LIQUID FILLED ORAL 2 TIMES DAILY PRN
Status: DISCONTINUED | OUTPATIENT
Start: 2021-12-15 | End: 2021-12-16 | Stop reason: HOSPADM

## 2021-12-15 RX ORDER — FENTANYL CITRATE 50 UG/ML
INJECTION, SOLUTION INTRAMUSCULAR; INTRAVENOUS AS NEEDED
Status: DISCONTINUED | OUTPATIENT
Start: 2021-12-15 | End: 2021-12-15 | Stop reason: SURG

## 2021-12-15 RX ORDER — PANTOPRAZOLE SODIUM 40 MG/10ML
40 INJECTION, POWDER, LYOPHILIZED, FOR SOLUTION INTRAVENOUS
Status: DISCONTINUED | OUTPATIENT
Start: 2021-12-16 | End: 2021-12-15

## 2021-12-15 RX ORDER — ACETAMINOPHEN 500 MG
1000 TABLET ORAL ONCE
Status: COMPLETED | OUTPATIENT
Start: 2021-12-15 | End: 2021-12-15

## 2021-12-15 RX ORDER — OXYCODONE HYDROCHLORIDE 5 MG/1
5 TABLET ORAL
Status: DISCONTINUED | OUTPATIENT
Start: 2021-12-15 | End: 2021-12-15 | Stop reason: HOSPADM

## 2021-12-15 RX ORDER — SUCCINYLCHOLINE/SOD CL,ISO/PF 100 MG/5ML
SYRINGE (ML) INTRAVENOUS AS NEEDED
Status: DISCONTINUED | OUTPATIENT
Start: 2021-12-15 | End: 2021-12-15 | Stop reason: SURG

## 2021-12-15 RX ORDER — DIPHENHYDRAMINE HYDROCHLORIDE 50 MG/ML
12.5 INJECTION INTRAMUSCULAR; INTRAVENOUS ONCE
Status: COMPLETED | OUTPATIENT
Start: 2021-12-15 | End: 2021-12-15

## 2021-12-15 RX ORDER — LIDOCAINE HYDROCHLORIDE 20 MG/ML
INJECTION, SOLUTION INFILTRATION; PERINEURAL AS NEEDED
Status: DISCONTINUED | OUTPATIENT
Start: 2021-12-15 | End: 2021-12-15 | Stop reason: SURG

## 2021-12-15 RX ORDER — MEPERIDINE HYDROCHLORIDE 25 MG/ML
12.5 INJECTION INTRAMUSCULAR; INTRAVENOUS; SUBCUTANEOUS
Status: DISCONTINUED | OUTPATIENT
Start: 2021-12-15 | End: 2021-12-15 | Stop reason: HOSPADM

## 2021-12-15 RX ORDER — SODIUM CHLORIDE 9 MG/ML
50 INJECTION, SOLUTION INTRAVENOUS CONTINUOUS
Status: DISCONTINUED | OUTPATIENT
Start: 2021-12-15 | End: 2021-12-16 | Stop reason: HOSPADM

## 2021-12-15 RX ORDER — HYDROMORPHONE HCL 110MG/55ML
PATIENT CONTROLLED ANALGESIA SYRINGE INTRAVENOUS AS NEEDED
Status: DISCONTINUED | OUTPATIENT
Start: 2021-12-15 | End: 2021-12-15 | Stop reason: SURG

## 2021-12-15 RX ORDER — SODIUM CHLORIDE 0.9 % (FLUSH) 0.9 %
10 SYRINGE (ML) INJECTION EVERY 12 HOURS SCHEDULED
Status: DISCONTINUED | OUTPATIENT
Start: 2021-12-15 | End: 2021-12-15 | Stop reason: HOSPADM

## 2021-12-15 RX ORDER — MAGNESIUM HYDROXIDE 1200 MG/15ML
LIQUID ORAL AS NEEDED
Status: DISCONTINUED | OUTPATIENT
Start: 2021-12-15 | End: 2021-12-15 | Stop reason: HOSPADM

## 2021-12-15 RX ORDER — METOCLOPRAMIDE HYDROCHLORIDE 5 MG/ML
10 INJECTION INTRAMUSCULAR; INTRAVENOUS ONCE
Status: COMPLETED | OUTPATIENT
Start: 2021-12-15 | End: 2021-12-15

## 2021-12-15 RX ORDER — ONDANSETRON 2 MG/ML
INJECTION INTRAMUSCULAR; INTRAVENOUS AS NEEDED
Status: DISCONTINUED | OUTPATIENT
Start: 2021-12-15 | End: 2021-12-15 | Stop reason: SURG

## 2021-12-15 RX ORDER — DEXAMETHASONE SODIUM PHOSPHATE 4 MG/ML
INJECTION, SOLUTION INTRA-ARTICULAR; INTRALESIONAL; INTRAMUSCULAR; INTRAVENOUS; SOFT TISSUE AS NEEDED
Status: DISCONTINUED | OUTPATIENT
Start: 2021-12-15 | End: 2021-12-15 | Stop reason: SURG

## 2021-12-15 RX ORDER — ONDANSETRON 4 MG/1
4 TABLET, FILM COATED ORAL EVERY 6 HOURS PRN
Status: DISCONTINUED | OUTPATIENT
Start: 2021-12-15 | End: 2021-12-16 | Stop reason: HOSPADM

## 2021-12-15 RX ADMIN — FENTANYL CITRATE 100 MCG: 50 INJECTION, SOLUTION INTRAMUSCULAR; INTRAVENOUS at 18:31

## 2021-12-15 RX ADMIN — LIDOCAINE HYDROCHLORIDE 100 MG: 20 INJECTION, SOLUTION INFILTRATION; PERINEURAL at 18:31

## 2021-12-15 RX ADMIN — HYDROCODONE BITARTRATE AND ACETAMINOPHEN 1 TABLET: 5; 325 TABLET ORAL at 23:20

## 2021-12-15 RX ADMIN — ONDANSETRON 4 MG: 2 INJECTION INTRAMUSCULAR; INTRAVENOUS at 14:57

## 2021-12-15 RX ADMIN — HYDROMORPHONE HYDROCHLORIDE 2 MG: 2 INJECTION, SOLUTION INTRAMUSCULAR; INTRAVENOUS; SUBCUTANEOUS at 19:05

## 2021-12-15 RX ADMIN — HYDROMORPHONE HYDROCHLORIDE 0.5 MG: 1 INJECTION, SOLUTION INTRAMUSCULAR; INTRAVENOUS; SUBCUTANEOUS at 13:52

## 2021-12-15 RX ADMIN — PANTOPRAZOLE SODIUM 40 MG: 40 INJECTION, POWDER, FOR SOLUTION INTRAVENOUS at 16:00

## 2021-12-15 RX ADMIN — Medication 100 MG: at 18:32

## 2021-12-15 RX ADMIN — IOPAMIDOL 100 ML: 755 INJECTION, SOLUTION INTRAVENOUS at 15:25

## 2021-12-15 RX ADMIN — KETOROLAC TROMETHAMINE 30 MG: 30 INJECTION, SOLUTION INTRAMUSCULAR; INTRAVENOUS at 19:23

## 2021-12-15 RX ADMIN — INDOCYANINE GREEN AND WATER 1.25 MG: KIT at 18:26

## 2021-12-15 RX ADMIN — METOCLOPRAMIDE 10 MG: 5 INJECTION, SOLUTION INTRAMUSCULAR; INTRAVENOUS at 13:38

## 2021-12-15 RX ADMIN — DEXAMETHASONE SODIUM PHOSPHATE 4 MG: 4 INJECTION INTRA-ARTICULAR; INTRALESIONAL; INTRAMUSCULAR; INTRAVENOUS; SOFT TISSUE at 18:55

## 2021-12-15 RX ADMIN — SODIUM CHLORIDE, POTASSIUM CHLORIDE, SODIUM LACTATE AND CALCIUM CHLORIDE 70 ML/HR: 600; 310; 30; 20 INJECTION, SOLUTION INTRAVENOUS at 17:40

## 2021-12-15 RX ADMIN — SODIUM CHLORIDE, POTASSIUM CHLORIDE, SODIUM LACTATE AND CALCIUM CHLORIDE 9 ML/HR: 600; 310; 30; 20 INJECTION, SOLUTION INTRAVENOUS at 18:30

## 2021-12-15 RX ADMIN — MIDAZOLAM HYDROCHLORIDE 2 MG: 1 INJECTION, SOLUTION INTRAMUSCULAR; INTRAVENOUS at 18:22

## 2021-12-15 RX ADMIN — ACETAMINOPHEN 1000 MG: 500 TABLET, FILM COATED ORAL at 18:20

## 2021-12-15 RX ADMIN — ONDANSETRON 4 MG: 2 INJECTION INTRAMUSCULAR; INTRAVENOUS at 18:55

## 2021-12-15 RX ADMIN — Medication 3 G: at 18:13

## 2021-12-15 RX ADMIN — ROCURONIUM BROMIDE 10 MG: 10 INJECTION INTRAVENOUS at 18:31

## 2021-12-15 RX ADMIN — DIPHENHYDRAMINE HYDROCHLORIDE 12.5 MG: 50 INJECTION, SOLUTION INTRAMUSCULAR; INTRAVENOUS at 13:38

## 2021-12-15 RX ADMIN — SODIUM CHLORIDE 50 ML/HR: 9 INJECTION, SOLUTION INTRAVENOUS at 23:16

## 2021-12-15 RX ADMIN — PROPOFOL 200 MG: 10 INJECTION, EMULSION INTRAVENOUS at 18:31

## 2021-12-15 RX ADMIN — DEXMEDETOMIDINE HYDROCHLORIDE 20 MCG: 100 INJECTION, SOLUTION, CONCENTRATE INTRAVENOUS at 18:57

## 2021-12-15 RX ADMIN — ROCURONIUM BROMIDE 40 MG: 10 INJECTION INTRAVENOUS at 18:37

## 2021-12-15 RX ADMIN — SUGAMMADEX 200 MG: 100 INJECTION, SOLUTION INTRAVENOUS at 19:40

## 2021-12-15 NOTE — H&P
[facility]   HISTORY AND PHYSICAL    Patient Name: Long Casillas  : 1985  MRN: [id number]  Primary Care Physician:  Provider, No Known  Date of admission: 12/15/2021    Subjective   Subjective     Chief Complaint: Upper abdominal pain    HPI:    Long Casillas is a 36 y.o. male who presents with acute onset of upper abdominal pain earlier this morning.  His pain is worsened and he came to the emergency room.  He was noted to have a white blood cell count of 20,000 and his CT scan suggests gallstones.    Review of Systems   Respiratory: Negative for shortness of breath.    Cardiovascular: Negative for chest pain.   Gastrointestinal: Positive for abdominal pain.       Personal History     Past Medical History:   Diagnosis Date   • Carpal tunnel syndrome 2021   • Cervical radiculopathy 2021   • Cervicalgia    • Low back pain 2021   • Lumbar radiculopathy 2021       Past Surgical History:   Procedure Laterality Date   • CARPAL TUNNEL RELEASE Right 2021    [facility]       Family History: family history includes Diabetes in an other family member. Otherwise pertinent FHx was reviewed and not pertinent to current issue.    Social History:  reports that he has been smoking cigars. He has been smoking about 0.50 packs per day. He has never used smokeless tobacco. He reports current alcohol use. He reports previous drug use. Drug: Marijuana.    Home Medications:  ketorolac    Allergies:  No Known Allergies    Objective    Objective     Vitals:   Temp:  [98.3 °F (36.8 °C)] 98.3 °F (36.8 °C)  Heart Rate:  [49-67] 49  Resp:  [12-18] 18  BP: (122-158)/(55-89) 153/85    Physical Exam  HENT:      Head: Normocephalic.   Cardiovascular:      Rate and Rhythm: Normal rate.   Pulmonary:      Effort: Pulmonary effort is normal.   Abdominal:      Tenderness: There is abdominal tenderness in the right upper quadrant.   Musculoskeletal:         General: Normal range of motion.      Cervical back: Normal  range of motion.   Skin:     General: Skin is warm.   Neurological:      General: No focal deficit present.      Mental Status: He is alert.   Psychiatric:         Mood and Affect: Mood normal.         Result Review    Result Review:  I have personally reviewed the results from the time of this admission to 12/15/2021 17:13 EST and agree with these findings:  [x]  Laboratory  []  Microbiology  [x]  Radiology  []  EKG/Telemetry   []  Cardiology/Vascular   []  Pathology  []  Old records  []  Other:  Most notable findings include: White blood cell count of 20,000 and gallstones on CT scan.    Assessment/Plan   Assessment / Plan     Brief Patient Summary:  Long Casillas is a 36 y.o. male who appears to have acute cholecystitis    Active Hospital Problems:  There are no active hospital problems to display for this patient.      Plan:   We will proceed with a laparoscopic cholecystectomy.  Risk benefits and alternatives were explained.    DVT prophylaxis:  No DVT prophylaxis order currently exists.    CODE STATUS:         Admission Status:  I believe this patient meets patient status.    Electronically signed by Chin Wright MD, 12/15/21, 5:13 PM EST.

## 2021-12-15 NOTE — ED PROVIDER NOTES
Time: 1:32 PM EST (upon arrival)  Arrived by: Ambulance  Chief Complaint:   Chief Complaint   Patient presents with   • Shortness of Breath   • Chest Pain   • Loss of Consciousness     History provided by: Patient and EMS  History is limited by: N/A     History of Present Illness:    Long Casillas is a 36 y.o. male who presents to the emergency department today with complaints of severe and constant chest pain. The patient reports that this morning, he developed nausea and vomiting with subsequent pain to his substernal chest as well as his epigastrium and right upper quadrant. He denies any diarrhea with this illness.    EMS reports that they received a call today for the patient's chest pain and shortness of breath. Upon arrival, they state that they found the patient standing by a tree, at which time they note that he complained of 10/10 substernal chest pain. Once he was loaded in the ambulance, EMS notes that the patient became very pale and diaphoretic. They were advised by the patient that his pain had worsened.     EMS notes that the patient had a sinus arrythmia and was bradycardic with a rate of 40. They report that they gave the patient aspirin, nitroglycerin, and roughly 400mL of fluids. They note that the patient's heart rate did elevate to 50 at that time. However, they add that the patient became unresponsive en route including to pain. He did become more responsive upon arrival in the ED. The patient notes that his pain continues to be severe in the ED.    The patient denies any significant medical problems including asthma, diabetes, hypertension, or high cholesterol. He denies any prior abdominal surgeries. The patient currently smokes cigars per triage and has a history of occasional alcohol use. There are no other acute complaints at this time.        History provided by:  Patient and EMS personnel   used: No    Chest Pain  Pain location:  Substernal area and epigastric (Right  "upper quadrant)  Pain quality comment:  \"pain\"  Pain radiates to:  Does not radiate  Pain severity:  Severe  Onset quality:  Sudden  Duration:  1 hour (PTA; approximate)  Timing:  Constant  Progression:  Worsening  Chronicity:  New  Context comment:  Patient was feeling ill this morning with vomiting. He states that soon after he vomited, he developed severe chest/epigastric/RUQ pain and shortness of breath.  Relieved by:  Nothing  Worsened by:  Nothing  Ineffective treatments:  Nitroglycerin and aspirin (Fluids)  Associated symptoms: abdominal pain (epigastric), diaphoresis, nausea, shortness of breath and vomiting    Associated symptoms: no back pain, no cough and no fever    Risk factors: male sex and smoking (cigars per triage)    Risk factors: no diabetes mellitus, no high cholesterol and no hypertension        Similar Symptoms Previously: No.  Recently seen: Patient was seen in the ED on 10/25/2021 for a wound check.      Patient Care Team  Primary Care Provider: Provider, No Known    Past Medical History:     No Known Allergies  Past Medical History:   Diagnosis Date   • Carpal tunnel syndrome 04/20/2021   • Cervical radiculopathy 04/20/2021   • Cervicalgia    • Low back pain 04/20/2021   • Lumbar radiculopathy 04/20/2021     Past Surgical History:   Procedure Laterality Date   • CARPAL TUNNEL RELEASE Right 04/30/2021    [facility]     Family History   Problem Relation Age of Onset   • Diabetes Other         UNSPECIFIED       Home Medications:  Prior to Admission medications    Medication Sig Start Date End Date Taking? Authorizing Provider   ketorolac (TORADOL) 10 MG tablet Take 1 tablet by mouth Every 6 (Six) Hours As Needed for Moderate Pain . 10/21/21   Darlin Ward PA-C        Social History:   Social History     Tobacco Use   • Smoking status: Current Every Day Smoker     Packs/day: 0.50     Types: Cigars   • Smokeless tobacco: Never Used   Vaping Use   • Vaping Use: Never used   Substance Use Topics " "  • Alcohol use: Yes     Comment: rarely   • Drug use: Not Currently     Types: Marijuana     Recent travel: not applicable     Review of Systems:  Review of Systems   Constitutional: Positive for diaphoresis. Negative for chills and fever.   HENT: Negative for nosebleeds.    Eyes: Negative for redness.   Respiratory: Positive for shortness of breath. Negative for cough.    Cardiovascular: Positive for chest pain (substernal; severe).   Gastrointestinal: Positive for abdominal pain (epigastric), nausea and vomiting. Negative for diarrhea.   Genitourinary: Negative for dysuria and frequency.   Musculoskeletal: Negative for back pain and neck pain.   Skin: Positive for pallor. Negative for rash.   Neurological: Negative for seizures.        Period of unresponsiveness.   All other systems reviewed and are negative.       Physical Exam:  /85   Pulse (!) 49   Temp 98.3 °F (36.8 °C) (Oral)   Resp 18   Ht 182.9 cm (72\")   Wt 123 kg (271 lb 6.2 oz)   SpO2 97%   BMI 36.81 kg/m²     Physical Exam  Vitals and nursing note reviewed.   Constitutional:       General: He is not in acute distress.  HENT:      Head: Normocephalic and atraumatic.      Nose: Nose normal.      Mouth/Throat:      Mouth: Mucous membranes are moist.   Eyes:      General: No scleral icterus.  Cardiovascular:      Rate and Rhythm: Normal rate and regular rhythm.      Heart sounds: Normal heart sounds. No murmur heard.      Pulmonary:      Effort: No respiratory distress.      Breath sounds: Normal breath sounds.   Abdominal:      Palpations: Abdomen is soft.      Tenderness: There is abdominal tenderness in the right upper quadrant and epigastric area.   Musculoskeletal:         General: No tenderness. Normal range of motion.      Cervical back: Normal range of motion and neck supple.      Right lower leg: No edema.      Left lower leg: No edema.   Skin:     General: Skin is warm and dry.   Neurological:      Mental Status: He is alert and " oriented to person, place, and time.   Psychiatric:         Behavior: Behavior normal.                Medications in the Emergency Department:  Medications   sodium chloride 0.9 % flush 10 mL (has no administration in time range)   aspirin chewable tablet 324 mg (324 mg Oral Not Given 12/15/21 1350)   HYDROmorphone (DILAUDID) injection 0.5 mg (0.5 mg Intravenous Given 12/15/21 1352)   promethazine (PHENERGAN) IVPB 12.5 mg (has no administration in time range)   diphenhydrAMINE (BENADRYL) injection 12.5 mg (12.5 mg Intravenous Given 12/15/21 1338)   metoclopramide (REGLAN) injection 10 mg (10 mg Intravenous Given 12/15/21 1338)   ondansetron (ZOFRAN) injection 4 mg (4 mg Intravenous Given 12/15/21 1457)   iopamidol (ISOVUE-370) 76 % injection 100 mL (100 mL Intravenous Given 12/15/21 1525)   pantoprazole (PROTONIX) injection 40 mg (40 mg Intravenous Given 12/15/21 1600)        Labs  Labs Reviewed   COMPREHENSIVE METABOLIC PANEL - Abnormal; Notable for the following components:       Result Value    Glucose 140 (*)     Creatinine 0.69 (*)     BUN/Creatinine Ratio 26.1 (*)     All other components within normal limits    Narrative:     GFR Normal >60  Chronic Kidney Disease <60  Kidney Failure <15     CBC WITH AUTO DIFFERENTIAL - Abnormal; Notable for the following components:    WBC 20.44 (*)     Neutrophil % 83.6 (*)     Lymphocyte % 11.3 (*)     Monocyte % 3.8 (*)     Neutrophils, Absolute 17.07 (*)     Immature Grans, Absolute 0.11 (*)     All other components within normal limits   ABG WITH COOX AND ELECTROLYTES - Abnormal; Notable for the following components:    pH, Arterial 7.457 (*)     pCO2, Arterial 31.3 (*)     pO2, Arterial 206.6 (*)     HCO3, Arterial 21.6 (*)     Hemoglobin, Blood Gas 17.2 (*)     Carboxyhemoglobin 8.4 (*)     Oxyhemoglobin 90.3 (*)     Glucose, Arterial 136 (*)     All other components within normal limits   POCT GLUCOSE FINGERSTICK - Abnormal; Notable for the following components:     Glucose 121 (*)     All other components within normal limits   LIPASE - Normal   BNP (IN-HOUSE) - Normal    Narrative:     Among patients with dyspnea, NT-proBNP is highly sensitive for the detection of acute congestive heart failure. In addition NT-proBNP of <300 pg/ml effectively rules out acute congestive heart failure with 99% negative predictive value.    Results may be falsely decreased if patient taking Biotin.     MAGNESIUM - Normal   CK TOTAL AND CKMB - Normal    Narrative:     CKMB results may be falsely decreased if patient taking Biotin.   POCT TROPONIN I - Normal   POCT TROPONIN I - Normal   RAINBOW DRAW    Narrative:     The following orders were created for panel order [place] Draw.  Procedure                               Abnormality         Status                     ---------                               -----------         ------                     Green Top (Gel)[[id number]]                                  Final result               Lavender Top[[id number]]                                                                Gold Top - SST[[id number]]                                   Final result               Light Blue Top[[id number]]                                   Final result                 Please view results for these tests on the individual orders.   POCT TROPONIN I   POCT TROPONIN I   CBC AND DIFFERENTIAL    Narrative:     The following orders were created for panel order CBC & Differential.  Procedure                               Abnormality         Status                     ---------                               -----------         ------                     CBC Auto Differential[[id number]]        Abnormal            Final result                 Please view results for these tests on the individual orders.   GREEN TOP   GOLD TOP - SST   LIGHT BLUE TOP   POCT TROPONIN-I WITH HOLD TUBE    Narrative:     The following orders were created for panel order POC Troponin I with Hold  Tube.  Procedure                               Abnormality         Status                     ---------                               -----------         ------                     POC Troponin I[[id number]]                                                              HOLD Troponin-I Tube[[id number]]                                                          Please view results for these tests on the individual orders.   POCT TROPONIN-I WITH HOLD TUBE    Narrative:     The following orders were created for panel order POC Troponin I with Hold Tube.  Procedure                               Abnormality         Status                     ---------                               -----------         ------                     POC Troponin I[[id number]]                                                              HOLD Troponin-I Tube[[id number]]                                                          Please view results for these tests on the individual orders.   LAVENDER TOP   HOLD ISTAT TROPONIN-I TUBE   HOLD ISTAT TROPONIN-I TUBE        Imaging:  CT Abdomen Pelvis With Contrast    Result Date: 12/15/2021  PROCEDURE: CT ABDOMEN PELVIS W CONTRAST  COMPARISON: 10/21/2021  INDICATIONS: a.p. Abdomen pain with nausea and vomiting  TECHNIQUE: After obtaining the patient's consent, CT images were created with non-ionic intravenous contrast material.   PROTOCOL:   Standard imaging protocol performed    RADIATION:   DLP: 2883.8mGy*cm   Automated exposure control was utilized to minimize radiation dose. CONTRAST: 100cc Isovue 370 I.V.  FINDINGS:   No evidence of pancreatitis or other acute process of the organs of the abdomen.  Normal appearance of the liver and spleen.  10 mm cyst of the right kidney.  No pyelonephritis.  No aneurysm hydronephrosis.  Suggestion of subtle cholelithiasis but no evidence of cholecystitis.  No evidence of choledocholithiasis.  Normal diameter of the common bile duct, no evidence of obstruction.   Resolution of the previous fluid collection of the perineum.  No evidence of abscess on this examination.  No acute findings of the pelvis.  Normal appearance of the bladder.  There is prominence of the submucosal fat throughout the colon.  No evidence of active colitis.  No bowel obstruction.  No acute findings of bowel, mostly collapsed throughout the abdomen pelvis.  Normal appendix.   CONCLUSION: No evidence of acute process in the abdomen and pelvis.  Prominent submucosal fat throughout the colon which can be seen with inflammatory bowel disease.  No evidence of active colitis.  Resolution of the previous fluid collection of the perineum presently comparison.  Subtle low-density cholelithiasis is suggested but no evidence of cholecystitis.     COURTNEY HADDAD MD       Electronically Signed and Approved By: COURTNEY HADDAD MD on 12/15/2021 at 15:41             XR Chest 1 View    Result Date: 12/15/2021  PROCEDURE: XR CHEST 1 VW  COMPARISON: None  INDICATIONS: Chest Pain triage protocol  FINDINGS:  No consolidations or pleural effusions are observed. The cardiac silhouette is within normal limits. The mediastinum is unremarkable. No definitive acute osseous abnormalities are seen on this single view.  CONCLUSION:  1. No evidence for acute cardiopulmonary process.       CHUCK DUNN MD       Electronically Signed and Approved By: CHUCK DUNN MD on 12/15/2021 at 14:15             CT Chest Pulmonary Embolism    Result Date: 12/15/2021  PROCEDURE: CT CHEST PULMONARY EMBOLISM W CONTRAST  COMPARISON:  [facility], CT, CT ABDOMEN PELVIS W CONTRAST, 10/21/2021, 11:33. INDICATIONS: PE suspected, high prob  TECHNIQUE: After obtaining the patient's consent, CT images were obtained with non-ionic intravenous contrast material.   PROTOCOL:   Pulmonary embolism imaging protocol performed    RADIATION:   DLP: 9967mGy*cm   Automated exposure control was utilized to minimize radiation dose. CONTRAST: 100cc Isovue 370  I.V.  FINDINGS:  No evidence of pulmonary embolism.  Normal caliber of the main pulmonary artery.  No acute findings of the mediastinum.  No dissection of the aorta or aneurysm.  No pericardial effusion.  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  No pneumonia or pulmonary edema or other acute findings of either lung.  No pneumothorax or pleural effusion.  CONCLUSION: No evidence of pulmonary embolism or other acute abnormality of the chest.     COURTNEY HADDAD MD       Electronically Signed and Approved By: COURTNEY HADDAD MD on 12/15/2021 at 15:47               Procedures:  Procedures    Progress  ED Course as of 12/15/21 1704   Wed Dec 15, 2021   1338 EKG: Rate 57, normal P waves, normal QRS, normal ST segment, normal QT interval, no previous for comparison. [RW]   1652 At bedside reevaluating the patient and updating on lab and imaging results. Advised that we will speak with Dr. Wright and return with further recommendations. Informed the patient that we will get him medication for his nausea. Patient is agreeable. [RF]      ED Course User Index  [RF] Valencia Fabian  [RW] Rolly Leon MD                            Medical Decision Making:  MDM  Number of Diagnoses or Management Options  Diagnosis management comments: Patient presents complaint of chest and abdominal pain.  Old records reviewed and has had prior visits for neurology related issues.  Differential diagnostic consideration include ACS versus PE bowel obstruction or acute cholecystitis.  CT scan of the chest abdomen pelvis is remarkable for cholelithiasis as read by radiology and reviewed by me.  White count 20,000.  Liver function studies normal troponin normal making ACS unlikely.  EKG unremarkable as read by me.  ED course he received analgesics and antiemetics with symptomatic improvement.  General  surgical consultation is obtained and the patient be admitted to surgery service for definitive management.  He stable on final assessment.  Acute cholecystitis       Amount and/or Complexity of Data Reviewed  Clinical lab tests: reviewed  Tests in the radiology section of CPT®: reviewed  Tests in the medicine section of CPT®: reviewed    Risk of Complications, Morbidity, and/or Mortality  Presenting problems: high  Management options: low    Critical Care  Total time providing critical care: 30-74 minutes    Patient Progress  Patient progress: improved       Final diagnoses:   Acute cholecystitis        Disposition:  ED Disposition     ED Disposition Condition Comment    Decision to Admit            Documentation assistance provided by Valencia Fabian acting as scribe for Dr. Rolly Leon. Information recorded by the scribe was done at my direction and has been verified and validated by me.        Valencia Fabian  12/15/21 1347       Valencia Fabian  12/15/21 1350       Valencia Fabian  12/15/21 7500       Rolly Leon MD  12/15/21 2725

## 2021-12-15 NOTE — ANESTHESIA PREPROCEDURE EVALUATION
Anesthesia Evaluation     Patient summary reviewed and Nursing notes reviewed   no history of anesthetic complications:  NPO Solid Status: > 8 hours  NPO Liquid Status: > 2 hours           Airway   Mallampati: II  TM distance: >3 FB  Neck ROM: full  No difficulty expected  Dental      Pulmonary - normal exam    breath sounds clear to auscultation  (+) a smoker Current,   Cardiovascular - negative cardio ROS and normal exam  Exercise tolerance: good (4-7 METS)    Rhythm: regular        Neuro/Psych- negative ROS  GI/Hepatic/Renal/Endo - negative ROS     Musculoskeletal     Abdominal    Substance History      OB/GYN          Other                        Anesthesia Plan    ASA 2 - emergent     general       Anesthetic plan, all risks, benefits, and alternatives have been provided, discussed and informed consent has been obtained with: patient.

## 2021-12-16 VITALS
BODY MASS INDEX: 36.76 KG/M2 | TEMPERATURE: 98.6 F | DIASTOLIC BLOOD PRESSURE: 83 MMHG | OXYGEN SATURATION: 98 % | HEART RATE: 68 BPM | SYSTOLIC BLOOD PRESSURE: 137 MMHG | HEIGHT: 72 IN | WEIGHT: 271.39 LBS | RESPIRATION RATE: 16 BRPM

## 2021-12-16 PROBLEM — Z90.49 S/P LAPAROSCOPIC CHOLECYSTECTOMY: Status: ACTIVE | Noted: 2021-12-16

## 2021-12-16 LAB
BASOPHILS # BLD AUTO: 0.06 10*3/MM3 (ref 0–0.2)
BASOPHILS NFR BLD AUTO: 0.2 % (ref 0–1.5)
DEPRECATED RDW RBC AUTO: 44.4 FL (ref 37–54)
EOSINOPHIL # BLD AUTO: 0.02 10*3/MM3 (ref 0–0.4)
EOSINOPHIL NFR BLD AUTO: 0.1 % (ref 0.3–6.2)
ERYTHROCYTE [DISTWIDTH] IN BLOOD BY AUTOMATED COUNT: 13.4 % (ref 12.3–15.4)
HCT VFR BLD AUTO: 43.4 % (ref 37.5–51)
HGB BLD-MCNC: 15.3 G/DL (ref 13–17.7)
IMM GRANULOCYTES # BLD AUTO: 0.22 10*3/MM3 (ref 0–0.05)
IMM GRANULOCYTES NFR BLD AUTO: 0.8 % (ref 0–0.5)
LYMPHOCYTES # BLD AUTO: 2.61 10*3/MM3 (ref 0.7–3.1)
LYMPHOCYTES NFR BLD AUTO: 9 % (ref 19.6–45.3)
MCH RBC QN AUTO: 31.7 PG (ref 26.6–33)
MCHC RBC AUTO-ENTMCNC: 35.3 G/DL (ref 31.5–35.7)
MCV RBC AUTO: 90 FL (ref 79–97)
MONOCYTES # BLD AUTO: 1.68 10*3/MM3 (ref 0.1–0.9)
MONOCYTES NFR BLD AUTO: 5.8 % (ref 5–12)
NEUTROPHILS NFR BLD AUTO: 24.47 10*3/MM3 (ref 1.7–7)
NEUTROPHILS NFR BLD AUTO: 84.1 % (ref 42.7–76)
NRBC BLD AUTO-RTO: 0 /100 WBC (ref 0–0.2)
PLATELET # BLD AUTO: 210 10*3/MM3 (ref 140–450)
PMV BLD AUTO: 11 FL (ref 6–12)
RBC # BLD AUTO: 4.82 10*6/MM3 (ref 4.14–5.8)
WBC NRBC COR # BLD: 29.06 10*3/MM3 (ref 3.4–10.8)

## 2021-12-16 PROCEDURE — 25010000002 MORPHINE PER 10 MG: Performed by: SURGERY

## 2021-12-16 PROCEDURE — 85025 COMPLETE CBC W/AUTO DIFF WBC: CPT | Performed by: SURGERY

## 2021-12-16 PROCEDURE — 25010000002 PIPERACILLIN SOD-TAZOBACTAM PER 1 G: Performed by: SURGERY

## 2021-12-16 RX ORDER — POLYETHYLENE GLYCOL 3350 17 G/17G
17 POWDER, FOR SOLUTION ORAL DAILY
Qty: 119 G | Refills: 0 | Status: SHIPPED | OUTPATIENT
Start: 2021-12-16

## 2021-12-16 RX ORDER — HYDROCODONE BITARTRATE AND ACETAMINOPHEN 5; 325 MG/1; MG/1
1 TABLET ORAL EVERY 4 HOURS PRN
Qty: 18 TABLET | Refills: 0 | Status: SHIPPED | OUTPATIENT
Start: 2021-12-16 | End: 2021-12-22

## 2021-12-16 RX ADMIN — HYDROCODONE BITARTRATE AND ACETAMINOPHEN 1 TABLET: 5; 325 TABLET ORAL at 06:43

## 2021-12-16 RX ADMIN — SODIUM CHLORIDE, PRESERVATIVE FREE 10 ML: 5 INJECTION INTRAVENOUS at 07:42

## 2021-12-16 RX ADMIN — TAZOBACTAM SODIUM AND PIPERACILLIN SODIUM 3.38 G: 375; 3 INJECTION, SOLUTION INTRAVENOUS at 02:16

## 2021-12-16 RX ADMIN — MORPHINE SULFATE 4 MG: 4 INJECTION INTRAVENOUS at 02:25

## 2021-12-16 NOTE — DISCHARGE INSTRUCTIONS
Minimally Invasive Cholecystectomy, Care After  This sheet gives you information about how to care for yourself after your procedure. Your doctor may also give you more specific instructions. If you have problems or questions, contact your doctor.  What can I expect after the procedure?  After the procedure, it is common:  · To have pain at the areas of surgery. You will be given medicines for pain.  · To vomit or feel like you may vomit.  · To feel fullness in the belly (bloating) or to have pain in the shoulder. This comes from the gas that was used during the surgery.  Follow these instructions at home:  Medicines  · Take over-the-counter and prescription medicines only as told by your doctor.  · If you were prescribed an antibiotic medicine, take it as told by your doctor. Do not stop using the antibiotic even if you start to feel better.  · Ask your doctor if the medicine prescribed to you:  ? Requires you to avoid driving or using machinery.  ? Can cause trouble pooping (constipation). You may need to take these actions to prevent or treat trouble pooping:  § Drink enough fluid to keep your pee (urine) pale yellow.  § Take over-the-counter or prescription medicines.  § Eat foods that are high in fiber. These include beans, whole grains, and fresh fruits and vegetables.  § Limit foods that are high in fat and sugar. These include fried or sweet foods.  Incision care    · Follow instructions from your doctor about how to take care of your cuts from surgery (incisions). Make sure you:  ? Wash your hands with soap and water for at least 20 seconds before and after you change your bandage (dressing). If you cannot use soap and water, use hand .  ? Change your bandage as told by your doctor.  ? Leave stitches (sutures), skin glue, or skin tape (adhesive) strips in place. They may need to stay in place for 2 weeks or longer. If tape strips get loose and curl up, you may trim the loose edges. Do not remove  tape strips completely unless your doctor says it is okay.  · Do not take baths, swim, or use a hot tub until your doctor approves. Ask your doctor if you may take showers. You may only be allowed to take sponge baths.  · Check your surgery area every day for signs of infection. Check for:  ? More redness, swelling, or pain.  ? Fluid or blood.  ? Warmth.  ? Pus or a bad smell.    Activity  · Rest as told by your doctor.  · Do not sit for a long time without moving. Get up to take short walks every 1-2 hours. This is important. Ask for help if you feel weak or unsteady.  · Do not lift anything that is heavier than 10 lb (4.5 kg), or the limit that you are told, until your doctor says that it is safe.  · Do not play contact sports until your doctor says it is okay.  · Do not return to work or school until your doctor says it is okay.  · Return to your normal activities as told by your doctor. Ask your doctor what activities are safe for you.  General instructions  · If you were given a medicine to help you relax (sedative) during your procedure, it can affect you for many hours. Do not drive or use machinery until your doctor says that it is safe.  · Keep all follow-up visits as told by your doctor. This is important.  Contact a doctor if:  · You get a rash.  · You have more redness, swelling, or pain around your cuts from surgery.  · You have fluid or blood coming from your cuts from surgery.  · Your cuts from surgery feel warm to the touch.  · You have pus or a bad smell coming from your cuts from surgery.  · You have a fever.  · One or more of your cuts from surgery breaks open.  Get help right away if:  · You have trouble breathing.  · You have chest pain.  · You have pain that is getting worse in your shoulders.  · You faint or feel dizzy when you stand.  · You have very bad pain in your belly (abdomen).  · You feel like you may vomit or you vomit, and this lasts for more than one day.  · You have leg  pain.  Summary  · After your surgery, it is common to have pain at the areas of surgery. You may also have vomiting or fullness in the belly.  · Follow your doctor's instructions about medicine, activity restrictions, and caring for your surgery areas. Do not do activities that require a lot of effort.  · Contact a doctor if you have a fever or other signs of infection, such as more redness, swelling, or pain around the cuts from surgery.  · Get help right away if you have chest pain, increasing pain in the shoulders, or trouble breathing.  This information is not intended to replace advice given to you by your health care provider. Make sure you discuss any questions you have with your health care provider.  Document Revised: 12/01/2020 Document Reviewed: 12/01/2020  Elsevier Patient Education © 2021 [employer]

## 2021-12-16 NOTE — PROGRESS NOTES
[facility]     Progress Note    Patient Name: Lnog Casillas  : 1985  MRN: [id number]    Date of admission: 12/15/2021    Subjective   Subjective     Patient without complaints.  Reports pain much improved compared to how he felt before surgery.  Tolerating regular diet.  Wants to go home.     Objective   Objective     Review of Systems:    A 10 point review of systems was performed and all are negative except for what is documented in the HPI.     Vitals:   Temp:  [97.9 °F (36.6 °C)-99.2 °F (37.3 °C)] 98.8 °F (37.1 °C)  Heart Rate:  [49-97] 89  Resp:  [12-18] 16  BP: (103-158)/(46-89) 126/71  Physical Exam    Constitutional: Awake, alert   Eyes: PERRLA    Neck: Supple, trachea midline   Respiratory: respirations even and unlabored   Cardiovascular: RRR   Gastrointestinal: Soft, approp TTP   Psychiatric: Appropriate affect, cooperative   Neurologic: Oriented x 3, speech clear   Skin: No rashes     Result Review    Result Review:  I have personally reviewed the results from the time of this admission to 2021 10:21 EST and agree with these findings:  []  Laboratory  []  Microbiology  []  Radiology  []  EKG/Telemetry   []  Cardiology/Vascular   []  Pathology  []  Old records  []  Other:      Assessment/Plan   Assessment / Plan     Diagnosis     S/P lap riaz  Active Hospital Problems:  Active Hospital Problems    Diagnosis    • S/P laparoscopic cholecystectomy    • Acute cholecystitis        Plan:    DC home       DVT prophylaxis:  Mechanical DVT prophylaxis orders are present.          This document has been electronically signed by NATALI Mckenzie  2021 10:21 EST

## 2021-12-16 NOTE — SIGNIFICANT NOTE
12/16/21 1149   Plan   Plan Patient sitting in chair putting his shoes on.  Patient reports being discharged today and is ready to go home.  Reports lives with dependent children and SO, Kristi.  Reports being self-employeed.  Provides his own IADL's.  Verified phone, address and pharmacy.  Plans to discharge today with no needs at this time.  CM updated PCP in EPIC

## 2021-12-16 NOTE — DISCHARGE SUMMARY
Date of Discharge:  12/16/2021    Discharge Diagnosis:   Acute cholecystitis [K81.0]  Post-Op Diagnosis Codes:     * Acute cholecystitis [K81.0]   S/P lap riaz  Problem List:  Active Hospital Problems    Diagnosis  POA   • S/P laparoscopic cholecystectomy [Z90.49]  Not Applicable   • Acute cholecystitis [K81.0]  Unknown      Resolved Hospital Problems   No resolved problems to display.       Presenting Problem/History of Present Illness          Hospital Course  Patient is a 36 y.o. male presented to the ED at [facility] on 12/15/2021 with upper abdominal pain.  He had a CT scan that indicated he had gallstones.  He was taken to surgery where he underwent a laparoscopic cholecystectomy.  He was admitted after the procedure for routine postoperative care.  Upon discharge to home he is tolerating a regular diet and his pain is controlled.       Procedures Performed    Procedure(s):  CHOLECYSTECTOMY LAPAROSCOPIC  -------------------       Consults:   Consults     No orders found for last 30 day(s).          Pertinent Test Results:       Vital Signs  Temp:  [97.9 °F (36.6 °C)-99.2 °F (37.3 °C)] 98.6 °F (37 °C)  Heart Rate:  [49-97] 68  Resp:  [12-18] 16  BP: (103-158)/(46-89) 137/83    Discharge Disposition  Home or Self Care    Discharge Medications     Discharge Medications      New Medications      Instructions Start Date   HYDROcodone-acetaminophen 5-325 MG per tablet  Commonly known as: NORCO   1 tablet, Oral, Every 4 Hours PRN      polyethylene glycol 17 GM/SCOOP powder  Commonly known as: MIRALAX   17 g, Oral, Daily             Discharge Diet   Diet Instructions     Diet: Regular      Discharge Diet: Regular          Activity at Discharge  Activity Instructions     Activity as Tolerated      Bathing Restrictions      Ok to shower    Type of Restriction: Bathing    Bathing Restrictions: No Tub Bath    Driving Restrictions      Type of Restriction: Driving    Driving Restrictions: No Driving While Taking Narcotics     Lifting Restrictions      Type of Restriction: Lifting    Lifting Restrictions: Lifting Restriction (Indicate Limit)    Weight Limit (Pounds): 5    Length of Lifting Restriction: 2 weeks          Follow-up Appointments  Future Appointments   Date Time Provider Department Center   12/28/2021  2:15 PM Chin Wright MD [facility]     Additional Instructions for the Follow-ups that You Need to Schedule     Discharge Follow-up with Specified Provider: Dr Wright; 2 Weeks   As directed      To: Dr Wright    Follow Up: 2 Weeks               Test Results Pending at Discharge  Pending Labs     Order Current Status    L[place] Top Collected (12/15/21 9623)    [place] Draw In process    Tissue Pathology Exam In process

## 2021-12-16 NOTE — PLAN OF CARE
Goal Outcome Evaluation:  Plan of Care Reviewed With: patient        Progress: improving  Outcome Summary: Patient doing great this morning.  He has walked the unit several times (greater than 1000ft) with minimal pain.  Incision sites look good.  Patient has been copy of AVS, IVs have been removed, and follow up instructions given.  Patient ready for discharge and will  medications from pharmacy on his way out.  Nothing further.    Jose FORTUNEN, RN

## 2021-12-16 NOTE — ANESTHESIA POSTPROCEDURE EVALUATION
Patient: Long Casillas    Procedure Summary     Date: 12/15/21 Room / Location: [facility] OR 08 / [facility] MAIN OR    Anesthesia Start: 1828 Anesthesia Stop: 2003    Procedure: CHOLECYSTECTOMY LAPAROSCOPIC (N/A Abdomen) Diagnosis:       Acute cholecystitis      (Acute cholecystitis [K81.0])    Surgeons: Chin Wright MD Provider: Pedro Lehman MD    Anesthesia Type: general ASA Status: 2 - Emergent          Anesthesia Type: general    Vitals  Vitals Value Taken Time   /54 12/15/21 2004   Temp     Pulse 86 12/15/21 2008   Resp     SpO2 95 % 12/15/21 2008   Vitals shown include unvalidated device data.        Post Anesthesia Care and Evaluation    Patient location during evaluation: bedside  Patient participation: complete - patient participated  Level of consciousness: sleepy but conscious  Pain management: adequate  Airway patency: patent  Anesthetic complications: No anesthetic complications  PONV Status: none  Cardiovascular status: acceptable and stable  Respiratory status: acceptable  Hydration status: acceptable    Comments: An Anesthesiologist personally participated in the most demanding procedures (including induction and emergence if applicable) in the anesthesia plan, monitored the course of anesthesia administration at frequent intervals and remained physically present and available for immediate diagnosis and treatment of emergencies.

## 2021-12-16 NOTE — OP NOTE
CHOLECYSTECTOMY LAPAROSCOPIC  Procedure Report    Patient Name:  Long Casillas  YOB: 1985    Date of Surgery:  12/15/2021     Indications: The patient is a 36-year-old gentleman that presented to the emergency room this afternoon with a 12-hour history of progressive upper abdominal pain.  His white count was elevated to 20,000 and he was noted to have gallstones on CT scan.  The decision was made to proceed with a laparoscopic cholecystectomy for presumed acute cholecystitis.    Pre-op Diagnosis: Acute cholecystitis    Post-Op Diagnosis: Same    Procedure/CPT® Codes:    Laparoscopic cholecystectomy    Staff:  Surgeon(s):  Chin Wright MD    Assistant: Lizeth Prescott CSA    Anesthesia: General    Estimated Blood Loss: 30 mL    Implants:    Implant Name Type Inv. Item Serial No.  Lot No. LRB No. Used Action   CLIPAPPLR M/ ENDO LIGACLIP ROT 10MM MD/EMMANUEL - [id number] Implant CLIPAPPLR M/ ENDO LIGACLIP ROT 10MM MD/EMMANUEL  [employer]  DIV OF J AND J [id number] N/A 1 Implanted       Specimen:          Specimens     ID Source Type Tests Collected By Collected At Frozen?    A Gallbladder Tissue · TISSUE PATHOLOGY EXAM   Chin Wright MD 12/15/21 1922     Description: Gallbladder and Contents              Findings: See above    Complications: None    Description of Procedure: The patient was taken to the operating room and placed on the table in supine position.  After induction of general endotracheal anesthesia, the abdomen was prepped and draped sterilely.  The abdomen was insufflated with a Veress needle placed above the umbilicus and a 5 mm supraumbilical port was placed into the peritoneal cavity using a Visiport.  A 12 mm epigastric port and two 5 mm right flank ports were then placed under direct vision.  The gallbladder was identified and was noted to have omental adhesions up to the dome of the gallbladder.  These adhesions were taken down with cautery.  The gallbladder was  noted to be markedly distended and appeared to be full of stones.  The dome of the gallbladder was grasped and retracted cephalad and the infundibulum was grasped and retracted inferiorly and laterally.  The gallbladder cystic duct junction was then dissected and a critical view of safety was obtained.  The camera was switched over to ICG mode and we clearly saw the cystic duct coming up into the infundibulum of the gallbladder and we visualized the common bile duct medially.  The cystic duct was then clipped 3 times proximally and once distally and then divided with scissors.  The cystic artery was then dissected and clipped 3 times proximally and once distally and then also divided with scissors.  The gallbladder was then dissected free from the gallbladder fossa with cautery and brought out through the epigastric port site in an Endopouch bag.  Prior to removing the gallbladder we carefully examined the gallbladder fossa and we appear to have good hemostasis and I saw no evidence of a bile leak.  We ended up having to enlarge the epigastric port in order to remove the gallbladder given the size of the gallbladder.  The ports were removed.  The epigastric port site fascial defect was then closed with interrupted figure-of-eight 0 Vicryl sutures.  The skin incisions were closed with 4-0 Vicryl subcuticular sutures.  Sterile dressings were applied and the patient was taken to the postanesthesia recovery room in stable condition.      Assistant: Lizeth Prescott CSA  was responsible for performing the following activities: Retraction, Suction, Suturing and Held/Positioned Camera and their skilled assistance was necessary for the success of this case.    Chin Wright MD     Date: 12/15/2021  Time: 19:41 EST

## 2021-12-17 ENCOUNTER — READMISSION MANAGEMENT (OUTPATIENT)
Dept: CALL CENTER | Facility: HOSPITAL | Age: 36
End: 2021-12-17

## 2021-12-17 LAB
CYTO UR: NORMAL
LAB AP CASE REPORT: NORMAL
LAB AP CLINICAL INFORMATION: NORMAL
PATH REPORT.FINAL DX SPEC: NORMAL
PATH REPORT.GROSS SPEC: NORMAL

## 2021-12-17 NOTE — OUTREACH NOTE
Prep Survey      Responses   [facility] facility patient discharged from? [facility]   Is LACE score < 7 ? No   Emergency Room discharge w/ pulse ox? No   Eligibility Readm Mgmt   Discharge diagnosis  laparoscopic cholecystectomy    Does the patient have one of the following disease processes/diagnoses(primary or secondary)? General Surgery   Does the patient have Home health ordered? No   Is there a DME ordered? No   Prep survey completed? Yes          Luzma Tatum RN

## 2021-12-20 ENCOUNTER — READMISSION MANAGEMENT (OUTPATIENT)
Dept: CALL CENTER | Facility: HOSPITAL | Age: 36
End: 2021-12-20

## 2021-12-20 NOTE — OUTREACH NOTE
General Surgery Week 1 Survey      Responses   [facility] patient discharged from? [facility]   Does the patient have one of the following disease processes/diagnoses(primary or secondary)? General Surgery   Week 1 attempt successful? Yes   Call start time 1631   Call end time 1635   Discharge diagnosis  laparoscopic cholecystectomy    Meds reviewed with patient/caregiver? Yes   Is the patient having any side effects they believe may be caused by any medication additions or changes? No   Does the patient have all medications related to this admission filled (includes all antibiotics, pain medications, etc.) Yes   Is the patient taking all medications as directed (includes completed medication regime)? Yes   Does the patient have a follow up appointment scheduled with their surgeon? Yes   Has the patient kept scheduled appointments due by today? N/A   Has home health visited the patient within 72 hours of discharge? N/A   Psychosocial issues? No   Did the patient receive a copy of their discharge instructions? Yes   Nursing interventions Reviewed instructions with patient   What is the patient's perception of their health status since discharge? Improving   Nursing interventions Nurse provided patient education   Is the patient /caregiver able to teach back basic post-op care? Continue use of incentive spirometry at least 1 week post discharge,  Practice 'cough and deep breath',  Drive as instructed by MD in discharge instructions,  Take showers only when approved by MD-sponge bathe until then,  No tub bath, swimming, or hot tub until instructed by MD,  Keep incision areas clean,dry and protected,  Do not remove steri-strips,  Lifting as instructed by MD in discharge instructions   Is the patient/caregiver able to teach back signs and symptoms of incisional infection? Increased redness, swelling or pain at the incisonal site,  Increased drainage or bleeding,  Incisional warmth,  Pus or odor from incision,  Fever    Is the patient/caregiver able to teach back steps to recovery at home? Set small, achievable goals for return to baseline health,  Rest and rebuild strength, gradually increase activity,  Eat a well-balance diet   If the patient is a current smoker, are they able to teach back resources for cessation? --  [trying to quit , no success with David patches]   Is the patient/caregiver able to teach back the hierarchy of who to call/visit for symptoms/problems? PCP, Specialist, Home health nurse, Urgent Care, ED, 911 Yes   Additional teach back comments states healing well, states is interested in taking a DM diet class to avoid DM in future   Week 1 call completed? Yes          Mikki Gudino, RN

## 2021-12-21 LAB
QT INTERVAL: 407 MS
QT INTERVAL: 443 MS

## 2021-12-28 ENCOUNTER — OFFICE VISIT (OUTPATIENT)
Dept: SURGERY | Facility: CLINIC | Age: 36
End: 2021-12-28

## 2021-12-28 VITALS — OXYGEN SATURATION: 96 % | WEIGHT: 272 LBS | BODY MASS INDEX: 36.89 KG/M2 | HEART RATE: 90 BPM

## 2021-12-28 DIAGNOSIS — Z90.49 S/P LAPAROSCOPIC CHOLECYSTECTOMY: Primary | ICD-10-CM

## 2021-12-28 PROCEDURE — 99024 POSTOP FOLLOW-UP VISIT: CPT | Performed by: SURGERY

## 2021-12-28 NOTE — PROGRESS NOTES
Chief Complaint  Post-op Follow-up    Subjective          Long Casillas presents to [facility] GENERAL SURGERY  History of Present Illness    Long Casillas is a 36 y.o. male  who presents today for a postoperative visit.     Patient is here for a follow-up after a recent admission for gallstones and cholecystitis.  He is now status post a laparoscopic cholecystectomy and is feeling better.    Past History:  Medical History: has a past medical history of Carpal tunnel syndrome (04/20/2021), Cervical radiculopathy (04/20/2021), Cervicalgia, Low back pain (04/20/2021), Lumbar radiculopathy (04/20/2021), and S/P laparoscopic cholecystectomy (12/16/2021).   Surgical History: has a past surgical history that includes Carpal tunnel release (Right, 04/30/2021) and Cholecystectomy (N/A, 12/15/2021).   Family History: family history includes Diabetes in an other family member.   Social History: reports that he has been smoking cigars. He has been smoking about 0.50 packs per day. He has never used smokeless tobacco. He reports current alcohol use. He reports previous drug use. Drug: Marijuana.  Allergies: Nicotine polacrilex       Current Outpatient Medications:   •  polyethylene glycol (MIRALAX) 17 GM/SCOOP powder, Take 17 g by mouth Daily., Disp: 119 g, Rfl: 0       Physical Exam  His incisions look good today his abdomen is soft.  Objective     Vital Signs:   Pulse 90   Wt 123 kg (272 lb)   SpO2 96%   BMI 36.89 kg/m²              Assessment and Plan    Diagnoses and all orders for this visit:    1. S/P laparoscopic cholecystectomy (Primary)    I will see him back on an as-needed basis.  I have asked him to call me should he have any further problems.

## 2021-12-29 ENCOUNTER — READMISSION MANAGEMENT (OUTPATIENT)
Dept: CALL CENTER | Facility: HOSPITAL | Age: 36
End: 2021-12-29

## 2021-12-29 NOTE — OUTREACH NOTE
General Surgery Week 2 Survey      Responses   [facility] patient discharged from? [facility]   Does the patient have one of the following disease processes/diagnoses(primary or secondary)? General Surgery   Week 2 attempt successful? No   Unsuccessful attempts Attempt 1          Aline Messina RN

## 2022-01-04 ENCOUNTER — READMISSION MANAGEMENT (OUTPATIENT)
Dept: CALL CENTER | Facility: HOSPITAL | Age: 37
End: 2022-01-04

## 2022-01-04 NOTE — OUTREACH NOTE
General Surgery Week 2 Survey      Responses   [facility] patient discharged from? [facility]   Does the patient have one of the following disease processes/diagnoses(primary or secondary)? General Surgery   Week 2 attempt successful? Yes   Call start time 0753   Call end time 0755   Discharge diagnosis  laparoscopic cholecystectomy    Meds reviewed with patient/caregiver? Yes   Is the patient having any side effects they believe may be caused by any medication additions or changes? No   Does the patient have all medications related to this admission filled (includes all antibiotics, pain medications, etc.) Yes   Is the patient taking all medications as directed (includes completed medication regime)? Yes   Does the patient have a follow up appointment scheduled with their surgeon? Yes   Has the patient kept scheduled appointments due by today? Yes   Comments Saw surgeon last Tuesday, satisfied with progress.    Has home health visited the patient within 72 hours of discharge? N/A   Has all DME been delivered? No   Psychosocial issues? No   Did the patient receive a copy of their discharge instructions? Yes   Nursing interventions Reviewed instructions with patient   What is the patient's perception of their health status since discharge? Improving   Nursing interventions Nurse provided patient education   Is the patient /caregiver able to teach back basic post-op care? Lifting as instructed by MD in discharge instructions,  Keep incision areas clean,dry and protected,  Take showers only when approved by MD-sponge bathe until then   Is the patient/caregiver able to teach back signs and symptoms of incisional infection? Increased redness, swelling or pain at the incisonal site,  Increased drainage or bleeding,  Incisional warmth,  Pus or odor from incision   Is the patient/caregiver able to teach back steps to recovery at home? Rest and rebuild strength, gradually increase activity,  Eat a well-balance diet   Is  the patient/caregiver able to teach back the hierarchy of who to call/visit for symptoms/problems? PCP, Specialist, Home health nurse, Urgent Care, ED, 911 Yes   Additional teach back comments States he doing well, incisions are healed. is doing some work but still cannot lift until next week.    Week 2 call completed? Yes   Wrap up additional comments No needs identified.           Jolie Bacon, RN

## 2022-01-13 ENCOUNTER — READMISSION MANAGEMENT (OUTPATIENT)
Dept: CALL CENTER | Facility: HOSPITAL | Age: 37
End: 2022-01-13

## 2022-01-14 NOTE — OUTREACH NOTE
Sepsis Week 3 Survey      Responses   [facility] patient discharged from? [facility]   Does the patient have one of the following disease processes/diagnoses(primary or secondary)? General Surgery   Call start time 1801   Call end time 1803   Meds reviewed with patient/caregiver? Yes   Is the patient taking all medications as directed (includes completed medication regime)? Yes   Has the patient kept scheduled appointments due by today? Yes   Comments has seen the surgeon   Did the patient receive a copy of their discharge instructions? Yes   What is the patient's perception of their health status since discharge? Improving   Revoked No further contact(revokes)-requires comment   Is the patient interested in additional calls from an ambulatory ?  NOTE:  applies to high risk patients requiring additional follow-up. No   Wrap up additional comments has been release by surgeon but is to still be carefully. offered no complaints          Cassandra Daniel RN

## 2022-02-03 ENCOUNTER — HOSPITAL ENCOUNTER (EMERGENCY)
Facility: HOSPITAL | Age: 37
Discharge: HOME | End: 2022-02-03
Attending: EMERGENCY MEDICINE | Admitting: EMERGENCY MEDICINE

## 2022-02-03 VITALS
TEMPERATURE: 98.4 F | DIASTOLIC BLOOD PRESSURE: 79 MMHG | HEIGHT: 72 IN | SYSTOLIC BLOOD PRESSURE: 140 MMHG | BODY MASS INDEX: 37.39 KG/M2 | HEART RATE: 70 BPM | WEIGHT: 276.02 LBS | OXYGEN SATURATION: 97 % | RESPIRATION RATE: 16 BRPM

## 2022-02-03 DIAGNOSIS — K08.89 PAIN, DENTAL: Primary | ICD-10-CM

## 2022-02-03 DIAGNOSIS — S02.5XXA CLOSED AVULSION FRACTURE OF TOOTH, INITIAL ENCOUNTER: ICD-10-CM

## 2022-02-03 PROCEDURE — 99283 EMERGENCY DEPT VISIT LOW MDM: CPT

## 2022-02-03 PROCEDURE — 96372 THER/PROPH/DIAG INJ SC/IM: CPT

## 2022-02-03 PROCEDURE — 25010000002 KETOROLAC TROMETHAMINE PER 15 MG: Performed by: NURSE PRACTITIONER

## 2022-02-03 RX ORDER — KETOROLAC TROMETHAMINE 10 MG/1
10 TABLET, FILM COATED ORAL EVERY 6 HOURS PRN
Qty: 20 TABLET | Refills: 0 | Status: SHIPPED | OUTPATIENT
Start: 2022-02-03

## 2022-02-03 RX ORDER — LIDOCAINE HYDROCHLORIDE 20 MG/ML
10 SOLUTION OROPHARYNGEAL ONCE
Status: COMPLETED | OUTPATIENT
Start: 2022-02-03 | End: 2022-02-03

## 2022-02-03 RX ORDER — KETOROLAC TROMETHAMINE 30 MG/ML
60 INJECTION, SOLUTION INTRAMUSCULAR; INTRAVENOUS ONCE
Status: COMPLETED | OUTPATIENT
Start: 2022-02-03 | End: 2022-02-03

## 2022-02-03 RX ORDER — LIDOCAINE HYDROCHLORIDE 20 MG/ML
5 SOLUTION OROPHARYNGEAL AS NEEDED
Qty: 100 ML | Refills: 0 | Status: SHIPPED | OUTPATIENT
Start: 2022-02-03

## 2022-02-03 RX ADMIN — LIDOCAINE HYDROCHLORIDE 10 ML: 20 SOLUTION ORAL; TOPICAL at 03:20

## 2022-02-03 RX ADMIN — KETOROLAC TROMETHAMINE 60 MG: 60 INJECTION, SOLUTION INTRAMUSCULAR at 03:19

## 2022-02-03 NOTE — ED PROVIDER NOTES
Time: 03:04 EST  Arrived by: Private vehicle  Chief Complaint: Dental pain  History provided by: Patient  History is limited by: N/A    History of Present Illness:  Patient is a 36 y.o. year old male that presents to the emergency department with earlier tonight patient states he sneezed and bit down on his tooth and a piece chipped off.  Has a dentist but unsure when he will be able to get in and Aleve was not helping pain      Dental Pain  Location:  Lower  Lower teeth location:  31/RL 2nd molar  Quality:  Aching, throbbing and pressure-like  Severity:  Severe  Onset quality:  Gradual  Timing:  Constant  Progression:  Unchanged  Chronicity:  New  Context: dental fracture    Context comment:  Patient states he sneezed real hard and bit down and felt immediate pain in his tooth and I small chip of it came out  Relieved by:  Nothing  Worsened by:  Nothing  Ineffective treatments:  NSAIDs (Patient took 4 Aleve without relief)  Associated symptoms: no difficulty swallowing, no drooling, no facial pain, no facial swelling, no fever, no gum swelling, no oral bleeding, no oral lesions and no trismus            Similar Symptoms Previously: No  Recently seen: No      Patient Care Team  Primary Care Provider: ZACK Vale    Past Medical History:     Allergies   Allergen Reactions   • Nicotine Polacrilex Unknown - Low Severity     Patient stated that he is allergic to nicotine patches that they cause blisters on his arm     Past Medical History:   Diagnosis Date   • Carpal tunnel syndrome 04/20/2021   • Cervical radiculopathy 04/20/2021   • Cervicalgia    • Low back pain 04/20/2021   • Lumbar radiculopathy 04/20/2021   • S/P laparoscopic cholecystectomy 12/16/2021     Past Surgical History:   Procedure Laterality Date   • CARPAL TUNNEL RELEASE Right 04/30/2021    [facility]   • CHOLECYSTECTOMY N/A 12/15/2021    Procedure: CHOLECYSTECTOMY LAPAROSCOPIC;  Surgeon: Chin Wright MD;  Location: [facility] MAIN OR;  Service: General;   "Laterality: N/A;     Family History   Problem Relation Age of Onset   • Diabetes Other         UNSPECIFIED       Home Medications:  Prior to Admission medications    Medication Sig Start Date End Date Taking? Authorizing Provider   polyethylene glycol (MIRALAX) 17 GM/SCOOP powder Take 17 g by mouth Daily. 12/16/21   Dian Braden APRN        Social History:   PT  reports that he has been smoking cigars. He has been smoking about 0.50 packs per day. He has never used smokeless tobacco. He reports current alcohol use. He reports previous drug use. Drug: Marijuana.    Record Review:  I have reviewed the patient's records in [employer].     Review of Systems  Review of Systems   Constitutional: Negative for fever.   HENT: Positive for dental problem. Negative for drooling, facial swelling and mouth sores.    Gastrointestinal: Negative for nausea.   Skin: Negative.    Neurological: Negative.    Hematological: Negative.    Psychiatric/Behavioral: Negative.         Physical Exam  /79   Pulse 70   Temp 98.4 °F (36.9 °C)   Resp 16   Ht 182.9 cm (72\")   Wt 125 kg (276 lb 0.3 oz)   SpO2 97%   BMI 37.43 kg/m²     Physical Exam  Vitals and nursing note reviewed.   Constitutional:       General: He is not in acute distress.     Appearance: Normal appearance. He is not toxic-appearing.   HENT:      Head: Atraumatic.      Mouth/Throat:      Mouth: Mucous membranes are moist.     Eyes:      General: No scleral icterus.  Pulmonary:      Effort: Pulmonary effort is normal. No respiratory distress.   Abdominal:      Tenderness: There is no abdominal tenderness.   Musculoskeletal:         General: Normal range of motion.      Cervical back: Normal range of motion.   Skin:     General: Skin is warm and dry.   Neurological:      Mental Status: He is alert and oriented to person, place, and time.   Psychiatric:         Mood and Affect: Mood normal.         Behavior: Behavior normal.          ED Course  /79   Pulse 70   " "Temp 98.4 °F (36.9 °C)   Resp 16   Ht 182.9 cm (72\")   Wt 125 kg (276 lb 0.3 oz)   SpO2 97%   BMI 37.43 kg/m²   Results for orders placed or performed during the hospital encounter of 12/15/21   COVID-19,CEPHEID/MELISSA,COR/[facility]SON/PAD/ARTEM IN-HOUSE(OR EMERGENT/ADD-ON),NP SWAB IN TRANSPORT MEDIA 3-4 HR TAT, RT-PCR - Swab, Nasopharynx    Specimen: Nasopharynx; Swab   Result Value Ref Range    COVID19 Not Detected Not Detected - Ref. Range   Comprehensive Metabolic Panel    Specimen: Blood   Result Value Ref Range    Glucose 140 (H) 65 - 99 mg/dL    BUN 18 6 - 20 mg/dL    Creatinine 0.69 (L) 0.76 - 1.27 mg/dL    Sodium 137 136 - 145 mmol/L    Potassium 3.8 3.5 - 5.2 mmol/L    Chloride 101 98 - 107 mmol/L    CO2 24.7 22.0 - 29.0 mmol/L    Calcium 9.8 8.6 - 10.5 mg/dL    Total Protein 7.8 6.0 - 8.5 g/dL    Albumin 4.40 3.50 - 5.20 g/dL    ALT (SGPT) 18 1 - 41 U/L    AST (SGOT) 14 1 - 40 U/L    Alkaline Phosphatase 103 39 - 117 U/L    Total Bilirubin 0.9 0.0 - 1.2 mg/dL    eGFR Non African Amer 131 >60 mL/min/1.73    Globulin 3.4 gm/dL    A/G Ratio 1.3 g/dL    BUN/Creatinine Ratio 26.1 (H) 7.0 - 25.0    Anion Gap 11.3 5.0 - 15.0 mmol/L   Lipase    Specimen: Blood   Result Value Ref Range    Lipase 13 13 - 60 U/L   BNP    Specimen: Blood   Result Value Ref Range    proBNP 39.8 0.0 - 450.0 pg/mL   Magnesium    Specimen: Blood   Result Value Ref Range    Magnesium 2.1 1.6 - 2.6 mg/dL   CK Total & CKMB    Specimen: Blood   Result Value Ref Range    CKMB 1.17 <=10.40 ng/mL    Creatine Kinase 60 20 - 200 U/L   CBC Auto Differential    Specimen: Blood   Result Value Ref Range    WBC 20.44 (H) 3.40 - 10.80 10*3/mm3    RBC 5.22 4.14 - 5.80 10*6/mm3    Hemoglobin 16.5 13.0 - 17.7 g/dL    Hematocrit 47.4 37.5 - 51.0 %    MCV 90.8 79.0 - 97.0 fL    MCH 31.6 26.6 - 33.0 pg    MCHC 34.8 31.5 - 35.7 g/dL    RDW 13.4 12.3 - 15.4 %    RDW-SD 45.2 37.0 - 54.0 fl    MPV 10.6 6.0 - 12.0 fL    Platelets 225 140 - 450 10*3/mm3    Neutrophil % " 83.6 (H) 42.7 - 76.0 %    Lymphocyte % 11.3 (L) 19.6 - 45.3 %    Monocyte % 3.8 (L) 5.0 - 12.0 %    Eosinophil % 0.5 0.3 - 6.2 %    Basophil % 0.3 0.0 - 1.5 %    Immature Grans % 0.5 0.0 - 0.5 %    Neutrophils, Absolute 17.07 (H) 1.70 - 7.00 10*3/mm3    Lymphocytes, Absolute 2.31 0.70 - 3.10 10*3/mm3    Monocytes, Absolute 0.78 0.10 - 0.90 10*3/mm3    Eosinophils, Absolute 0.10 0.00 - 0.40 10*3/mm3    Basophils, Absolute 0.07 0.00 - 0.20 10*3/mm3    Immature Grans, Absolute 0.11 (H) 0.00 - 0.05 10*3/mm3    nRBC 0.0 0.0 - 0.2 /100 WBC   ABG with Co-Ox and Electrolytes    Specimen: Arm, Right; Arterial Blood   Result Value Ref Range    pH, Arterial 7.457 (H) 7.350 - 7.450 pH units    pCO2, Arterial 31.3 (L) 35.0 - 45.0 mm Hg    pO2, Arterial 206.6 (H) 80.0 - 100.0 mm Hg    HCO3, Arterial 21.6 (L) 22.0 - 26.0 mmol/L    Base Excess, Arterial -1.0 -2.0 - 2.0 mmol/L    O2 Saturation, Arterial 98.7 95.0 - 99.0 %    Hemoglobin, Blood Gas 17.2 (H) 13.8 - 16.4 g/dL    Carboxyhemoglobin 8.4 (H) 0 - 1.5 %    Methemoglobin 0.10 0.00 - 1.50 %    Oxyhemoglobin 90.3 (L) 94 - 99 %    FHHB 1.2 0.0 - 5.0 %    Vinny's Test Positive     Note      Site Arterial: right radial     Modality Non Rebreather     FIO2 100 %    Flow Rate 15 lpm    Sodium, Arterial 137.1 136 - 146 mmol/L    Potassium, Arterial 3.56 3.5 - 5 mmol/L    Ionized Calcium, Arterial 1.15 1.13 - 1.32 mmol/L    Chloride, Arterial 102 98 - 106 mmol/L    Glucose, Arterial 136 (H) 70 - 99 mmol/L    Lactate, Arterial 0.84 0.5 - 2 mmol/L    PO2/FIO2 207 0 - 500   CBC Auto Differential    Specimen: Blood   Result Value Ref Range    WBC 29.06 (H) 3.40 - 10.80 10*3/mm3    RBC 4.82 4.14 - 5.80 10*6/mm3    Hemoglobin 15.3 13.0 - 17.7 g/dL    Hematocrit 43.4 37.5 - 51.0 %    MCV 90.0 79.0 - 97.0 fL    MCH 31.7 26.6 - 33.0 pg    MCHC 35.3 31.5 - 35.7 g/dL    RDW 13.4 12.3 - 15.4 %    RDW-SD 44.4 37.0 - 54.0 fl    MPV 11.0 6.0 - 12.0 fL    Platelets 210 140 - 450 10*3/mm3    Neutrophil  % 84.1 (H) 42.7 - 76.0 %    Lymphocyte % 9.0 (L) 19.6 - 45.3 %    Monocyte % 5.8 5.0 - 12.0 %    Eosinophil % 0.1 (L) 0.3 - 6.2 %    Basophil % 0.2 0.0 - 1.5 %    Immature Grans % 0.8 (H) 0.0 - 0.5 %    Neutrophils, Absolute 24.47 (H) 1.70 - 7.00 10*3/mm3    Lymphocytes, Absolute 2.61 0.70 - 3.10 10*3/mm3    Monocytes, Absolute 1.68 (H) 0.10 - 0.90 10*3/mm3    Eosinophils, Absolute 0.02 0.00 - 0.40 10*3/mm3    Basophils, Absolute 0.06 0.00 - 0.20 10*3/mm3    Immature Grans, Absolute 0.22 (H) 0.00 - 0.05 10*3/mm3    nRBC 0.0 0.0 - 0.2 /100 WBC   POC Troponin I    Specimen: Blood   Result Value Ref Range    Troponin I 0.00 0.00 - 0.60 ng/mL   POC Glucose Once    Specimen: Blood   Result Value Ref Range    Glucose 121 (H) 70 - 99 mg/dL   POC Troponin I    Specimen: Blood   Result Value Ref Range    Troponin I 0.00 0.00 - 0.60 ng/mL   ECG 12 Lead   Result Value Ref Range    QT Interval 443 ms   ECG 12 Lead   Result Value Ref Range    QT Interval 407 ms   Tissue Pathology Exam    Specimen: Gallbladder; Tissue   Result Value Ref Range    Case Report       Surgical Pathology Report                         Case: [id number]                                  Authorizing Provider:  Chin Wright MD       Collected:           12/15/2021 07:22 PM          Ordering Location:     [facility] MAIN Received:            12/16/2021 10:43 AM                                 OR                                                                           Pathologist:           Mary Jo Arteaga MD                                                     Specimen:    Gallbladder, Gallbladder and Contents                                                      Clinical Information      Final Diagnosis       Gallbladder, cholecystectomy:    - Chronic cholecystitis    - Cholelithiasis         Gross Description       Gallbladder and contents: Received in formalin is a previously opened tan gallbladder measuring 10.5 cm in length and 4 cm  in diameter.  The gallbladder wall measures 0.3 cm in thickness.  The mucosal surface is reddish-tan with noted ulceration.  No polyps are noted grossly.  Numerous dark yellow multifaceted stones are found within the gallbladder lumen and specimen container measuring up to 1.4 cm in diameter.  Rep 1A.  CRE      Microscopic Description     Green Top (Gel)   Result Value Ref Range    Extra Tube Hold for add-ons.    Gold Top - SST   Result Value Ref Range    Extra Tube Hold for add-ons.    Light Blue Top   Result Value Ref Range    Extra Tube hold for add-on      Medications   Lidocaine Viscous HCl (XYLOCAINE) 2 % solution 10 mL (has no administration in time range)   ketorolac (TORADOL) injection 60 mg (has no administration in time range)     No results found.    Medical Decision Making:                     MDM  Number of Diagnoses or Management Options  Closed avulsion fracture of tooth, initial encounter  Pain, dental  Diagnosis management comments: I have spoken with the patient. I have explained the patient´s condition, diagnoses and treatment plan based on the information available to me at this time. I have answered the patient's questions and addressed any concerns. The patient has a good  understanding of the patient´s diagnosis, condition, and treatment plan as can be expected at this point. The vital signs have been stable. The patient´s condition is stable and appropriate for discharge from the emergency department.      The patient will pursue further outpatient evaluation with the primary care physician or other designated or consulting physician as outlined in the discharge instructions. They are agreeable to this plan of care and follow-up instructions have been explained in detail. The patient has received these instructions in written format and have expressed an understanding of the discharge instructions. The patient is aware that any significant change in condition or worsening of symptoms should  prompt an immediate return to this or the closest emergency department or call to 911.         Amount and/or Complexity of Data Reviewed  Tests in the medicine section of CPT®: ordered and reviewed    Risk of Complications, Morbidity, and/or Mortality  Presenting problems: minimal  Management options: minimal    Patient Progress  Patient progress: stable       Final diagnoses:   Pain, dental   Closed avulsion fracture of tooth, initial encounter        Disposition:  ED Disposition     ED Disposition Condition Comment    Discharge Stable            Sandra Girard, NATALI  02/03/22 [id number]

## 2022-02-03 NOTE — DISCHARGE INSTRUCTIONS
Medications as prescribed.    Follow-up with your dentist.    Return for new or worsening symptoms

## (undated) DEVICE — STERILE POLYISOPRENE POWDER-FREE SURGICAL GLOVES WITH EMOLLIENT COATING: Brand: PROTEXIS

## (undated) DEVICE — SLV SCD LEG COMFORT KENDALLSCD MD REPROC

## (undated) DEVICE — ANTIBACTERIAL UNDYED BRAIDED (POLYGLACTIN 910), SYNTHETIC ABSORBABLE SUTURE: Brand: COATED VICRYL

## (undated) DEVICE — 2, DISPOSABLE SUCTION/IRRIGATOR WITHOUT DISPOSABLE TIP: Brand: STRYKEFLOW

## (undated) DEVICE — NON-WOVEN ADHESIVE WOUND DRESSING: Brand: PRIMAPORE ADHESIVE WOUND DRSG 7.2*5CM

## (undated) DEVICE — SYS CLOSE PORTII CARTR/THOMASN XL

## (undated) DEVICE — ENDOPATH XCEL WITH OPTIVIEW TECHNOLOGY UNIVERSAL TROCAR STABILITY SLEEVES: Brand: ENDOPATH XCEL OPTIVIEW

## (undated) DEVICE — GLV SURG SENSICARE PI ORTHO SZ8 LF STRL

## (undated) DEVICE — TISSUE RETRIEVAL SYSTEM: Brand: INZII RETRIEVAL SYSTEM

## (undated) DEVICE — INTENDED FOR TISSUE SEPARATION, AND OTHER PROCEDURES THAT REQUIRE A SHARP SURGICAL BLADE TO PUNCTURE OR CUT.: Brand: BARD-PARKER ® CARBON RIB-BACK BLADES

## (undated) DEVICE — SOL IRR NACL 0.9PCT 3000ML

## (undated) DEVICE — SUT MERSILENE POLYSTR CT1 BR 0 75CM GRN

## (undated) DEVICE — ENDOPATH PNEUMONEEDLE INSUFFLATION NEEDLES WITH LUER LOCK CONNECTORS 120MM: Brand: ENDOPATH

## (undated) DEVICE — 3M™ STERI-STRIP™ REINFORCED ADHESIVE SKIN CLOSURES, R1547, 1/2 IN X 4 IN (12 MM X 100 MM), 6 STRIPS/ENVELOPE: Brand: 3M™ STERI-STRIP™

## (undated) DEVICE — NON-WOVEN ADHESIVE WOUND DRESSING: Brand: PRIMAPORE ADHESIVE DRESSING 10*8CM

## (undated) DEVICE — GENERAL LAPAROSCOPY-LF: Brand: MEDLINE INDUSTRIES, INC.

## (undated) DEVICE — GOWN,REINFRCE,POLY,SIRUS,BREATH SLV,XXLG: Brand: MEDLINE

## (undated) DEVICE — ENDOPATH XCEL WITH OPTIVIEW TECHNOLOGY BLADELESS TROCARS WITH STABILITY SLEEVES: Brand: ENDOPATH XCEL OPTIVIEW

## (undated) DEVICE — LAPAROSCOPIC WIRE J-HOOK ELECTRODE COATED: Brand: CLEANCOAT

## (undated) DEVICE — VISUALIZATION SYSTEM: Brand: CLEARIFY